# Patient Record
Sex: MALE | Race: WHITE | NOT HISPANIC OR LATINO | Employment: FULL TIME | URBAN - METROPOLITAN AREA
[De-identification: names, ages, dates, MRNs, and addresses within clinical notes are randomized per-mention and may not be internally consistent; named-entity substitution may affect disease eponyms.]

---

## 2017-10-18 ENCOUNTER — ALLSCRIPTS OFFICE VISIT (OUTPATIENT)
Dept: OTHER | Facility: OTHER | Age: 42
End: 2017-10-18

## 2017-10-18 DIAGNOSIS — M54.16 RADICULOPATHY OF LUMBAR REGION: ICD-10-CM

## 2017-10-18 DIAGNOSIS — M54.31 SCIATICA OF RIGHT SIDE: ICD-10-CM

## 2017-10-24 ENCOUNTER — APPOINTMENT (OUTPATIENT)
Dept: PHYSICAL THERAPY | Facility: CLINIC | Age: 42
End: 2017-10-24
Payer: COMMERCIAL

## 2017-10-24 ENCOUNTER — GENERIC CONVERSION - ENCOUNTER (OUTPATIENT)
Dept: OTHER | Facility: OTHER | Age: 42
End: 2017-10-24

## 2017-10-24 PROCEDURE — 97140 MANUAL THERAPY 1/> REGIONS: CPT

## 2017-10-24 PROCEDURE — 97162 PT EVAL MOD COMPLEX 30 MIN: CPT

## 2017-10-24 PROCEDURE — G8990 OTHER PT/OT CURRENT STATUS: HCPCS

## 2017-10-24 PROCEDURE — G8991 OTHER PT/OT GOAL STATUS: HCPCS

## 2017-10-26 ENCOUNTER — APPOINTMENT (OUTPATIENT)
Dept: PHYSICAL THERAPY | Facility: CLINIC | Age: 42
End: 2017-10-26
Payer: COMMERCIAL

## 2017-10-26 PROCEDURE — 97110 THERAPEUTIC EXERCISES: CPT

## 2017-10-26 PROCEDURE — 97112 NEUROMUSCULAR REEDUCATION: CPT

## 2017-10-26 PROCEDURE — 97140 MANUAL THERAPY 1/> REGIONS: CPT

## 2017-10-31 ENCOUNTER — APPOINTMENT (OUTPATIENT)
Dept: PHYSICAL THERAPY | Facility: CLINIC | Age: 42
End: 2017-10-31
Payer: COMMERCIAL

## 2017-10-31 PROCEDURE — 97110 THERAPEUTIC EXERCISES: CPT

## 2017-10-31 PROCEDURE — 97112 NEUROMUSCULAR REEDUCATION: CPT

## 2017-10-31 PROCEDURE — 97140 MANUAL THERAPY 1/> REGIONS: CPT

## 2017-11-02 ENCOUNTER — APPOINTMENT (OUTPATIENT)
Dept: PHYSICAL THERAPY | Facility: CLINIC | Age: 42
End: 2017-11-02
Payer: COMMERCIAL

## 2017-11-02 PROCEDURE — 97140 MANUAL THERAPY 1/> REGIONS: CPT

## 2017-11-02 PROCEDURE — 97112 NEUROMUSCULAR REEDUCATION: CPT

## 2017-11-02 PROCEDURE — 97110 THERAPEUTIC EXERCISES: CPT

## 2017-11-07 ENCOUNTER — APPOINTMENT (OUTPATIENT)
Dept: PHYSICAL THERAPY | Facility: CLINIC | Age: 42
End: 2017-11-07
Payer: COMMERCIAL

## 2017-11-07 PROCEDURE — 97140 MANUAL THERAPY 1/> REGIONS: CPT

## 2017-11-07 PROCEDURE — 97112 NEUROMUSCULAR REEDUCATION: CPT

## 2017-11-07 PROCEDURE — 97110 THERAPEUTIC EXERCISES: CPT

## 2017-11-09 ENCOUNTER — APPOINTMENT (OUTPATIENT)
Dept: PHYSICAL THERAPY | Facility: CLINIC | Age: 42
End: 2017-11-09
Payer: COMMERCIAL

## 2017-11-09 PROCEDURE — 97140 MANUAL THERAPY 1/> REGIONS: CPT

## 2017-11-09 PROCEDURE — 97110 THERAPEUTIC EXERCISES: CPT

## 2017-11-09 PROCEDURE — 97112 NEUROMUSCULAR REEDUCATION: CPT

## 2017-11-14 ENCOUNTER — APPOINTMENT (OUTPATIENT)
Dept: PHYSICAL THERAPY | Facility: CLINIC | Age: 42
End: 2017-11-14
Payer: COMMERCIAL

## 2017-11-14 PROCEDURE — 97110 THERAPEUTIC EXERCISES: CPT

## 2017-11-14 PROCEDURE — 97140 MANUAL THERAPY 1/> REGIONS: CPT

## 2017-11-14 PROCEDURE — 97112 NEUROMUSCULAR REEDUCATION: CPT

## 2017-11-16 ENCOUNTER — APPOINTMENT (OUTPATIENT)
Dept: PHYSICAL THERAPY | Facility: CLINIC | Age: 42
End: 2017-11-16
Payer: COMMERCIAL

## 2017-11-16 PROCEDURE — 97110 THERAPEUTIC EXERCISES: CPT

## 2017-11-16 PROCEDURE — 97140 MANUAL THERAPY 1/> REGIONS: CPT

## 2017-11-21 ENCOUNTER — APPOINTMENT (OUTPATIENT)
Dept: PHYSICAL THERAPY | Facility: CLINIC | Age: 42
End: 2017-11-21
Payer: COMMERCIAL

## 2017-11-21 PROCEDURE — 97110 THERAPEUTIC EXERCISES: CPT

## 2017-11-21 PROCEDURE — 97140 MANUAL THERAPY 1/> REGIONS: CPT

## 2017-11-21 PROCEDURE — 97112 NEUROMUSCULAR REEDUCATION: CPT

## 2017-11-24 ENCOUNTER — GENERIC CONVERSION - ENCOUNTER (OUTPATIENT)
Dept: OTHER | Facility: OTHER | Age: 42
End: 2017-11-24

## 2017-11-24 ENCOUNTER — APPOINTMENT (OUTPATIENT)
Dept: PHYSICAL THERAPY | Facility: CLINIC | Age: 42
End: 2017-11-24
Payer: COMMERCIAL

## 2017-11-24 PROCEDURE — 97112 NEUROMUSCULAR REEDUCATION: CPT

## 2017-11-24 PROCEDURE — 97110 THERAPEUTIC EXERCISES: CPT

## 2017-11-28 ENCOUNTER — GENERIC CONVERSION - ENCOUNTER (OUTPATIENT)
Dept: OTHER | Facility: OTHER | Age: 42
End: 2017-11-28

## 2017-11-28 ENCOUNTER — APPOINTMENT (OUTPATIENT)
Dept: PHYSICAL THERAPY | Facility: CLINIC | Age: 42
End: 2017-11-28
Payer: COMMERCIAL

## 2017-11-28 PROCEDURE — G8990 OTHER PT/OT CURRENT STATUS: HCPCS

## 2017-11-28 PROCEDURE — 97110 THERAPEUTIC EXERCISES: CPT

## 2017-11-28 PROCEDURE — 97140 MANUAL THERAPY 1/> REGIONS: CPT

## 2017-11-28 PROCEDURE — 97112 NEUROMUSCULAR REEDUCATION: CPT

## 2017-11-28 PROCEDURE — G8991 OTHER PT/OT GOAL STATUS: HCPCS

## 2017-11-29 ENCOUNTER — ALLSCRIPTS OFFICE VISIT (OUTPATIENT)
Dept: OTHER | Facility: OTHER | Age: 42
End: 2017-11-29

## 2017-11-29 ENCOUNTER — APPOINTMENT (OUTPATIENT)
Dept: RADIOLOGY | Facility: CLINIC | Age: 42
End: 2017-11-29
Payer: COMMERCIAL

## 2017-11-29 DIAGNOSIS — M54.9 DORSALGIA: ICD-10-CM

## 2017-11-29 DIAGNOSIS — M54.16 RADICULOPATHY OF LUMBAR REGION: ICD-10-CM

## 2017-11-29 PROCEDURE — 72100 X-RAY EXAM L-S SPINE 2/3 VWS: CPT

## 2017-12-01 ENCOUNTER — APPOINTMENT (OUTPATIENT)
Dept: PHYSICAL THERAPY | Facility: CLINIC | Age: 42
End: 2017-12-01
Payer: COMMERCIAL

## 2017-12-01 PROCEDURE — 97112 NEUROMUSCULAR REEDUCATION: CPT

## 2017-12-01 PROCEDURE — 97110 THERAPEUTIC EXERCISES: CPT

## 2017-12-05 ENCOUNTER — APPOINTMENT (OUTPATIENT)
Dept: PHYSICAL THERAPY | Facility: CLINIC | Age: 42
End: 2017-12-05
Payer: COMMERCIAL

## 2017-12-05 PROCEDURE — 97110 THERAPEUTIC EXERCISES: CPT

## 2017-12-07 ENCOUNTER — APPOINTMENT (OUTPATIENT)
Dept: PHYSICAL THERAPY | Facility: CLINIC | Age: 42
End: 2017-12-07
Payer: COMMERCIAL

## 2017-12-07 PROCEDURE — 97140 MANUAL THERAPY 1/> REGIONS: CPT

## 2017-12-07 PROCEDURE — 97110 THERAPEUTIC EXERCISES: CPT

## 2017-12-12 ENCOUNTER — GENERIC CONVERSION - ENCOUNTER (OUTPATIENT)
Dept: OTHER | Facility: OTHER | Age: 42
End: 2017-12-12

## 2017-12-12 ENCOUNTER — APPOINTMENT (OUTPATIENT)
Dept: PHYSICAL THERAPY | Facility: CLINIC | Age: 42
End: 2017-12-12
Payer: COMMERCIAL

## 2017-12-15 ENCOUNTER — APPOINTMENT (OUTPATIENT)
Dept: PHYSICAL THERAPY | Facility: CLINIC | Age: 42
End: 2017-12-15
Payer: COMMERCIAL

## 2017-12-19 ENCOUNTER — APPOINTMENT (OUTPATIENT)
Dept: PHYSICAL THERAPY | Facility: CLINIC | Age: 42
End: 2017-12-19
Payer: COMMERCIAL

## 2017-12-28 ENCOUNTER — APPOINTMENT (OUTPATIENT)
Dept: PHYSICAL THERAPY | Facility: CLINIC | Age: 42
End: 2017-12-28
Payer: COMMERCIAL

## 2018-01-10 NOTE — MISCELLANEOUS
Message  Return to work or school:   Josh Panchal is under my professional care  He was seen in my office on 11/29/2017CONTIN[employer] DUTY AT [address] [place] [address],         Signatures   Electronically signed by : VALENTE Craft ; Nov 29 2017  9:42AM EST                       (Author)

## 2018-01-11 NOTE — MISCELLANEOUS
Signatures   Electronically signed by : VALENTE Alas ; Nov 29 2017  9:40AM EST                       (Author)

## 2018-01-16 NOTE — MISCELLANEOUS
Message  Return to work or school:   Mulugeta Denny is under my professional care  He was seen in my office on OCTOBER 18, 2017  No heavy lifting at work at this time  Any questions please call our office  Radha Aguilar DO        Signatures   Electronically signed by : VALENTE Hernandez ; Oct 18 2017 11:36AM EST                       (Author)

## 2018-01-22 VITALS
HEIGHT: 76 IN | DIASTOLIC BLOOD PRESSURE: 88 MMHG | BODY MASS INDEX: 26.79 KG/M2 | WEIGHT: 220 LBS | HEART RATE: 77 BPM | SYSTOLIC BLOOD PRESSURE: 120 MMHG

## 2018-02-18 ENCOUNTER — OFFICE VISIT (OUTPATIENT)
Dept: URGENT CARE | Facility: CLINIC | Age: 43
End: 2018-02-18
Payer: COMMERCIAL

## 2018-02-18 VITALS
WEIGHT: 238.2 LBS | HEART RATE: 93 BPM | DIASTOLIC BLOOD PRESSURE: 68 MMHG | SYSTOLIC BLOOD PRESSURE: 112 MMHG | RESPIRATION RATE: 16 BRPM | OXYGEN SATURATION: 96 % | HEIGHT: 76 IN | BODY MASS INDEX: 29.01 KG/M2 | TEMPERATURE: 100 F

## 2018-02-18 DIAGNOSIS — J06.9 ACUTE URI: Primary | ICD-10-CM

## 2018-02-18 PROBLEM — M54.9 BACK PAIN: Status: ACTIVE | Noted: 2017-11-29

## 2018-02-18 PROBLEM — M54.31 SCIATICA OF RIGHT SIDE: Status: ACTIVE | Noted: 2017-10-18

## 2018-02-18 PROBLEM — M54.16 LUMBAR RADICULOPATHY, RIGHT: Status: ACTIVE | Noted: 2017-10-18

## 2018-02-18 PROCEDURE — 99213 OFFICE O/P EST LOW 20 MIN: CPT | Performed by: FAMILY MEDICINE

## 2018-02-18 RX ORDER — FLUTICASONE PROPIONATE 50 MCG
SPRAY, SUSPENSION (ML) NASAL
COMMUNITY
End: 2018-12-01

## 2018-02-18 RX ORDER — VALACYCLOVIR HYDROCHLORIDE 1 G/1
TABLET, FILM COATED ORAL 2 TIMES DAILY PRN
COMMUNITY

## 2018-02-18 RX ORDER — BENZONATATE 200 MG/1
200 CAPSULE ORAL 3 TIMES DAILY PRN
Qty: 20 CAPSULE | Refills: 0 | Status: SHIPPED | OUTPATIENT
Start: 2018-02-18 | End: 2018-12-01

## 2018-02-18 NOTE — PATIENT INSTRUCTIONS
Acute viral upper resp infection, possibly the flu, however the patient is out of the window for treatment w/ Tamiflu at this time  There are no signs of bacterial infection on exam  I have advised supportive treatment with rest, plenty of fluids, Tylenol or Motrin as needed, warm salt water gargles, throat lozenges, and I have prescribed Tessalon pearls to be taken as needed for the cough  If symptoms persist despite treatment or worsen, should be seen by PCP for re-check

## 2018-02-18 NOTE — PROGRESS NOTES
Assessment/Plan:  Patient Instructions   Acute viral upper resp infection, possibly the flu, however the patient is out of the window for treatment w/ Tamiflu at this time  There are no signs of bacterial infection on exam  I have advised supportive treatment with rest, plenty of fluids, Tylenol or Motrin as needed, warm salt water gargles, throat lozenges, and I have prescribed Tessalon pearls to be taken as needed for the cough  If symptoms persist despite treatment or worsen, should be seen by PCP for re-check  No problem-specific Assessment & Plan notes found for this encounter  Diagnoses and all orders for this visit:    Acute URI  -     benzonatate (TESSALON) 200 MG capsule; Take 1 capsule (200 mg total) by mouth 3 (three) times a day as needed for cough    Other orders  -     fluticasone (FLONASE) 50 mcg/act nasal spray; into each nostril  -     valACYclovir (VALTREX) 1,000 mg tablet; Take by mouth          Subjective:      Patient ID: Abel Ramsey is a 40 y o  male  41 yo male presents c/o hoarse voice, sore throat, nasal congestion, and productive cough x 4 days  No chest pain, SOB, or wheezing  Non-smoker  Has felt feverish, but no documented fever  No body aches, some chills  No GI sx  No skin rashes  No recent travel or known sick contacts  Did not get the flu shot this year  Has been taking Advil Cold and Sinus as needed  The following portions of the patient's history were reviewed and updated as appropriate: allergies, current medications, past family history, past medical history, past social history, past surgical history and problem list     Review of Systems   Constitutional:        As noted in HPI   HENT:        As noted in HPI   Eyes: Negative  Respiratory: Positive for cough  Negative for shortness of breath and wheezing  Cardiovascular: Negative  Gastrointestinal: Negative  Musculoskeletal: Negative  Skin: Negative  Neurological: Negative  Psychiatric/Behavioral: Negative  Objective:      /68 (BP Location: Right arm, Patient Position: Sitting, Cuff Size: Large)   Pulse 93   Temp 100 °F (37 8 °C) (Tympanic)   Resp 16   Ht 6' 4" (1 93 m)   Wt 108 kg (238 lb 3 2 oz)   SpO2 96%   BMI 28 99 kg/m²          Physical Exam   Constitutional: He is oriented to person, place, and time  Vital signs are normal  He appears well-developed and well-nourished  He is active and cooperative  No distress  HENT:   Head: Normocephalic and atraumatic  Right Ear: Hearing, tympanic membrane, external ear and ear canal normal    Left Ear: Hearing, tympanic membrane, external ear and ear canal normal    Nose: Nose normal    Mouth/Throat: Oropharynx is clear and moist and mucous membranes are normal  No oropharyngeal exudate  Eyes: Conjunctivae and EOM are normal  Pupils are equal, round, and reactive to light  Neck: Normal range of motion  Neck supple  Cardiovascular: Normal rate, regular rhythm and normal heart sounds  Pulmonary/Chest: Effort normal and breath sounds normal  No respiratory distress  He has no wheezes  He has no rales  Lymphadenopathy:     He has no cervical adenopathy  Neurological: He is alert and oriented to person, place, and time  Psychiatric: He has a normal mood and affect  His speech is normal and behavior is normal  Judgment and thought content normal  Cognition and memory are normal    Nursing note and vitals reviewed

## 2018-12-01 ENCOUNTER — OFFICE VISIT (OUTPATIENT)
Dept: URGENT CARE | Facility: CLINIC | Age: 43
End: 2018-12-01
Payer: COMMERCIAL

## 2018-12-01 VITALS
HEART RATE: 77 BPM | RESPIRATION RATE: 18 BRPM | DIASTOLIC BLOOD PRESSURE: 80 MMHG | HEIGHT: 76 IN | SYSTOLIC BLOOD PRESSURE: 110 MMHG | BODY MASS INDEX: 28.69 KG/M2 | OXYGEN SATURATION: 97 % | WEIGHT: 235.6 LBS | TEMPERATURE: 97.1 F

## 2018-12-01 DIAGNOSIS — J01.90 ACUTE NON-RECURRENT SINUSITIS, UNSPECIFIED LOCATION: Primary | ICD-10-CM

## 2018-12-01 PROCEDURE — 99213 OFFICE O/P EST LOW 20 MIN: CPT | Performed by: PHYSICIAN ASSISTANT

## 2018-12-01 RX ORDER — NAPROXEN 500 MG/1
TABLET ORAL DAILY PRN
Refills: 0 | COMMUNITY
Start: 2018-10-25 | End: 2019-08-06

## 2018-12-01 RX ORDER — CEFUROXIME AXETIL 500 MG/1
500 TABLET ORAL EVERY 12 HOURS SCHEDULED
Qty: 20 TABLET | Refills: 0 | Status: SHIPPED | OUTPATIENT
Start: 2018-12-01 | End: 2018-12-11

## 2018-12-01 NOTE — PROGRESS NOTES
[facility]        NAME: Maddy Bell is a 44 y o  male  : 1975    MRN: [id number]  DATE: 2018  TIME: 10:19 AM    Assessment and Plan   Acute non-recurrent sinusitis, unspecified location [J01 90]  1  Acute non-recurrent sinusitis, unspecified location  cefuroxime (CEFTIN) 500 mg tablet         Patient Instructions     Discussed pt's condition with him  He has persistent symptoms of 1 week duration with no improvement despite adequate OTC symptomatic management  I will treat his acute sinusitis with a 10 day course of Ceftin and rec hydration, rest, continuing OTC cold meds and Veramyst, and observation  Follow up with PCP in 3-5 days  Proceed to  ER if symptoms worsen  Chief Complaint     Chief Complaint   Patient presents with    Facial Pain     x 1 week with nasal congestion, facial pain and b/l ear pressure with HA  No cough  fever  Taking Veramyst daily and  Claritin D  History of Present Illness       Pt presents with 1 week history of nasal congestion, B/L ear pressure, HA  Has had some discolored drainage as well as right sided epistaxis  Denies cough, ST, PND, fever, chills, N/V/D  He has taken Advil, Tylenol, Claritin-D and Veramyst  Has hx of allergies and is exposed to a fair amount of dust  Denies hx of asthma/allergies  Does not smoke  He did not receive the flu shot  Review of Systems   Review of Systems   Constitutional: Negative  HENT: Positive for congestion, ear pain (B/L pressure), sinus pain and sinus pressure  Negative for postnasal drip and sore throat  Respiratory: Negative  Cardiovascular: Negative  Gastrointestinal: Negative  Genitourinary: Negative  Neurological: Positive for headaches           Current Medications       Current Outpatient Prescriptions:     fluticasone (VERAMYST) 27 5 MCG/SPRAY nasal spray, 2 sprays into each nostril daily as needed for rhinitis, Disp: , Rfl:     naproxen (NAPROSYN) 500 mg tablet, daily as needed, Disp: , Rfl: 0    valACYclovir (VALTREX) 1,000 mg tablet, Take by mouth 2 (two) times a day as needed  , Disp: , Rfl:     cefuroxime (CEFTIN) 500 mg tablet, Take 1 tablet (500 mg total) by mouth every 12 (twelve) hours for 10 days Take with food  , Disp: 20 tablet, Rfl: 0    Current Allergies     Allergies as of 12/01/2018 - Reviewed 12/01/2018   Allergen Reaction Noted    Betadine [povidone iodine] Rash 02/18/2018            The following portions of the patient's history were reviewed and updated as appropriate: allergies, current medications, past family history, past medical history, past social history, past surgical history and problem list      Past Medical History:   Diagnosis Date    Allergic rhinitis     Herniated lumbar intervertebral disc        Past Surgical History:   Procedure Laterality Date    KNEE ARTHROSCOPY      THROAT SURGERY         Family History   Problem Relation Age of Onset    Diabetes Mother     Diabetes Father     Hypertension Father          Medications have been verified  Objective   /80 (BP Location: Left arm, Patient Position: Sitting, Cuff Size: Standard)   Pulse 77   Temp (!) 97 1 °F (36 2 °C) (Tympanic)   Resp 18   Ht 6' 4" (1 93 m)   Wt 107 kg (235 lb 9 6 oz)   SpO2 97%   BMI 28 68 kg/m²        Physical Exam     Physical Exam   Constitutional: He is oriented to person, place, and time  He appears well-developed and well-nourished  No distress  HENT:   Right Ear: Hearing, external ear and ear canal normal    Left Ear: Hearing, external ear and ear canal normal    Nose: Mucosal edema (B/L boggy erythematous turbinates) present  Mouth/Throat: Oropharynx is clear and moist and mucous membranes are normal    B/L dull TMs   Neck: Neck supple  Cardiovascular: Normal rate, regular rhythm and normal heart sounds  Pulmonary/Chest: Effort normal and breath sounds normal    Lymphadenopathy:     He has no cervical adenopathy  Neurological: He is alert and oriented to person, place, and time  Psychiatric: He has a normal mood and affect  Vitals reviewed

## 2019-04-08 PROBLEM — J45.909 UNCOMPLICATED ASTHMA: Status: ACTIVE | Noted: 2019-04-08

## 2019-04-08 PROBLEM — J30.89 ALLERGIC RHINITIS CAUSED BY MOLD: Status: ACTIVE | Noted: 2019-04-08

## 2019-04-08 PROBLEM — H10.13 ALLERGIC CONJUNCTIVITIS OF BOTH EYES: Status: ACTIVE | Noted: 2019-04-08

## 2019-04-08 PROBLEM — J30.1 SEASONAL ALLERGIC RHINITIS DUE TO POLLEN: Status: ACTIVE | Noted: 2019-04-08

## 2019-08-06 ENCOUNTER — OFFICE VISIT (OUTPATIENT)
Dept: URGENT CARE | Facility: CLINIC | Age: 44
End: 2019-08-06
Payer: COMMERCIAL

## 2019-08-06 VITALS
OXYGEN SATURATION: 99 % | RESPIRATION RATE: 18 BRPM | SYSTOLIC BLOOD PRESSURE: 140 MMHG | WEIGHT: 234.2 LBS | TEMPERATURE: 98.7 F | HEART RATE: 85 BPM | BODY MASS INDEX: 28.52 KG/M2 | HEIGHT: 76 IN | DIASTOLIC BLOOD PRESSURE: 70 MMHG
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
## 2019-08-06 DIAGNOSIS — S80.861A INSECT BITE OF RIGHT LEG, INITIAL ENCOUNTER: Primary | ICD-10-CM

## 2019-08-06 PROCEDURE — 99213 OFFICE O/P EST LOW 20 MIN: CPT | Performed by: FAMILY MEDICINE

## 2019-08-06 RX ORDER — LORATADINE AND PSEUDOEPHEDRINE 10; 240 MG/1; MG/1
1 TABLET, EXTENDED RELEASE ORAL DAILY PRN
COMMUNITY

## 2019-08-06 NOTE — PROGRESS NOTES
[facility]        NAME: Maxwell Burden is a 38 y o  male  : 1975    MRN: [id number]  DATE: 2019  TIME: 7:36 PM    Assessment and Plan   Insect bite of right leg, initial encounter [H23 913G, W57  XXXA]  1  Insect bite of right leg, initial encounter       Insect sting to the right lower extremity with mild edema  Given 4 days of prednisone 10 mg to counteract symptoms  Instructed to take a lower 40 mg today followed by 20 mg for the next 3 days  Dispensed medication:  Prednisone 10 mg    Patient Instructions     Follow up with PCP in 3-5 days  Proceed to  ER if symptoms worsen  Chief Complaint     Chief Complaint   Patient presents with    Insect Bite      - thinks he was stung by a yellow jacket multiple times medial R calf area  Has had 3 cm central red area  Today noted swelling and redness from foot to 3/4 way up R calf with increaseed tenderness  Took Benadryl 25 mg and Motrin 600 mg at 6 pm  Is to leave on airplane on vacation tomorrow morning  History of Present Illness     62-year-old male presents today due to an insect bite of the right lower extremity which occurred 2 days ago while mountain biking  While riding his bike, he felt a sting in the right lower leg  On examination he did not see a stinger  Eventually noticed area of redness in a cervical which progressed to swelling of right lower leg and ankle  Has been applying ice, taking Benadryl and Advil which has provided moderate relief  Denies any fevers, chills  Did not see the insect that stung him  Denies allergies to Hymenoptera stings  Plans to fly out to [place] tomorrow morning  Review of Systems   Review of Systems   Constitutional: Negative for chills and fever  Musculoskeletal: Positive for arthralgias  Skin: Positive for color change and rash           Current Medications       Current Outpatient Medications:     Acetaminophen 325 MG CAPS, Take by mouth, Disp: , Rfl:    fluticasone (VERAMYST) 27 5 MCG/SPRAY nasal spray, 2 sprays into each nostril daily as needed for rhinitis, Disp: , Rfl:     guaiFENesin (MUCINEX) 600 mg 12 hr tablet, Take 1,200 mg by mouth every 12 (twelve) hours as needed , Disp: , Rfl:     Ibuprofen 200 MG CAPS, Take 600 mg by mouth every 8 (eight) hours as needed , Disp: , Rfl:     loratadine-pseudoephedrine (CLARITIN-D 24-HOUR)  mg per 24 hr tablet, Take 1 tablet by mouth daily as needed for allergies, Disp: , Rfl:     valACYclovir (VALTREX) 1,000 mg tablet, Take by mouth 2 (two) times a day as needed  , Disp: , Rfl:     Current Allergies     Allergies as of 08/06/2019 - Reviewed 08/06/2019   Allergen Reaction Noted    Betadine [povidone iodine] Rash 02/18/2018            The following portions of the patient's history were reviewed and updated as appropriate: allergies, current medications, past family history, past medical history, past social history, past surgical history and problem list      Past Medical History:   Diagnosis Date    Allergic rhinitis     GERD (gastroesophageal reflux disease)     Herniated lumbar intervertebral disc     Migraines     Sinusitis        Past Surgical History:   Procedure Laterality Date    KNEE ARTHROSCOPY      THROAT SURGERY      WISDOM TOOTH EXTRACTION      X4       Family History   Problem Relation Age of Onset    Diabetes Mother     Diabetes Father     Hypertension Father     No Known Problems Sister     Mental illness Brother     Substance Abuse Brother          Medications have been verified  Objective   /70 (BP Location: Left arm, Patient Position: Sitting, Cuff Size: Large)   Pulse 85   Temp 98 7 °F (37 1 °C) (Tympanic)   Resp 18   Ht 6' 4" (1 93 m)   Wt 106 kg (234 lb 3 2 oz)   SpO2 99%   BMI 28 51 kg/m²        Physical Exam     Physical Exam   Constitutional: He is oriented to person, place, and time  He appears well-developed and well-nourished  No distress     HENT: Head: Normocephalic and atraumatic  Eyes: Conjunctivae are normal    Pulmonary/Chest: Effort normal    Neurological: He is alert and oriented to person, place, and time  Skin: Skin is warm  Rash noted  He is not diaphoretic  There is erythema  Area of ecchymoses on the anterior portion of right lower leg with distal faint erythema and swelling of the lower extremity  Psychiatric: He has a normal mood and affect  His behavior is normal  Judgment and thought content normal    Nursing note and vitals reviewed

## 2023-01-25 ENCOUNTER — OFFICE VISIT (OUTPATIENT)
Dept: URGENT CARE | Facility: CLINIC | Age: 48
End: 2023-01-25

## 2023-01-25 VITALS — TEMPERATURE: 98.5 F | OXYGEN SATURATION: 100 % | HEART RATE: 107 BPM

## 2023-01-25 DIAGNOSIS — Z20.828 CONTACT WITH AND (SUSPECTED) EXPOSURE TO OTHER VIRAL COMMUNICABLE DISEASES: ICD-10-CM

## 2023-01-25 DIAGNOSIS — J06.9 UPPER RESPIRATORY TRACT INFECTION, UNSPECIFIED TYPE: Primary | ICD-10-CM

## 2023-01-26 LAB
FLUAV RNA RESP QL NAA+PROBE: NEGATIVE
FLUBV RNA RESP QL NAA+PROBE: NEGATIVE
SARS-COV-2 RNA RESP QL NAA+PROBE: NEGATIVE

## 2023-01-26 NOTE — PROGRESS NOTES
[facility]        NAME: Neo Hanson is a 56 y o  male  : 1975    MRN: [id number]  DATE: 2023  TIME: 7:37 PM    Assessment and Plan   Upper respiratory tract infection, unspecified type [J06 9]  1  Upper respiratory tract infection, unspecified type  Covid19 and INFLUENZA A/B PCR      2  Contact with and (suspected) exposure to other viral communicable diseases  Covid19 and INFLUENZA A/B PCR        Discussed strict return to care precautions as well as red flag symptoms which should prompt immediate ED referral  Pt verbalized understanding and is in agreement with plan  Please follow up with your primary care provider within the next week  Please remember that your visit today was with an urgent care provider and should not replace follow up with your primary care provider for chronic medical issues or annual physicals  Advised to continue supportive care, must maintain adequate hydration  ([facility] from [facility])  IF YOU TEST POSITIVE FOR COVID-19:  If you have symptoms, you can end isolation after 5 full days if you are fever-free for 24 hours without the use of fever-reducing medication and your other symptoms have improved  Loss of taste and/or smell may persist for weeks or more and should not delay the end of isolation  Your first day of symptoms is DAY ZERO  You should continue to wear a well-fitting mask (like N95, [id number]) both at home and in public for 5 additional days  Avoid people who are at high risk for severe disease for at least 10 days  DO NOT go to places where you are unable to wear a mask until a full 10 days from your first symptom  If you have no symptoms, quarantine for 5 days from the day you were tested  The day you were tested is DAY ZERO  Continue wearing a mask around others until day 10  If you develop symptoms, your 5-day isolation period starts over    If you have/had severe symptoms and/or a compromised immune system, you may need to isolate longer  Consult with your primary care provider about when you can resume being around other people  IF YOU HAVE HAD CLOSE EXPOSURE:  QUARANTINE IF: You are ages 25 or older and either have not been vaccinated, or have been vaccinated with your primary series but not the booster  You must quarantine for at least 5 days after your last contact  If you develop symptoms, get tested immediately  If you do not develop symptoms, get tested at least 5 days after your last exposure  Avoid people who are immunocompromised at high risk for severe disease until at least after 10 days  YOU DO NOT HAVE TO QUARANTINE IF:   • You are 18 years or older and have received all recommended vaccine doses, including boosters and additional primary shots for some immunocompromised people  • You are ages 9-17 and completed the primary series of COVID-19 vaccines  • You had confirmed COVID-19 within the last 90 days on a viral test   You should still wear a well-fitting mask around others for 10 days from the date of your last close exposure to COVID-19, and get tested at least 5 days later  Quarantine and isolation guidelines differ for healthcare professionals  Patient Instructions       Follow up with PCP in 3-5 days  Proceed to  ER if symptoms worsen  Chief Complaint     Chief Complaint   Patient presents with   • Cold Like Symptoms     Congestion, sore throat, cough started yesterday   Concerned for sinus infection         History of Present Illness       Mich Guerin is a(n) 56 y o  male presenting with URI symptoms x 1 days  Past medical history: none reported  Congestion: yes  Sore throat: yes  Cough: yes  Sputum production: yes, slight  Fever: no  Body aches: no  Loss of smell/taste: no  GI symptoms: no  Known sick contacts: yes, coworker was coughing but unsure if he tested for anything  OTC meds tried: dayquil, mucinex-d  Vaccinated against COVID19: yes and flu        Review of Systems   Review of Systems Constitutional: Negative for chills, diaphoresis, fatigue and fever  HENT: Positive for congestion, sinus pressure and sore throat  Negative for ear pain, postnasal drip, rhinorrhea, sinus pain, sneezing and trouble swallowing  Eyes: Negative for pain and redness  Respiratory: Positive for cough  Negative for chest tightness, shortness of breath and wheezing  Cardiovascular: Negative for chest pain and leg swelling  Gastrointestinal: Negative for diarrhea, nausea and vomiting  Musculoskeletal: Negative for myalgias  Neurological: Negative for dizziness, weakness and headaches           Current Medications       Current Outpatient Medications:   •  Acetaminophen 325 MG CAPS, Take by mouth (Patient not taking: Reported on 1/25/2023), Disp: , Rfl:   •  fluticasone (VERAMYST) 27 5 MCG/SPRAY nasal spray, 2 sprays into each nostril daily as needed for rhinitis, Disp: , Rfl:   •  guaiFENesin (MUCINEX) 600 mg 12 hr tablet, Take 1,200 mg by mouth every 12 (twelve) hours as needed  (Patient not taking: Reported on 1/25/2023), Disp: , Rfl:   •  Ibuprofen 200 MG CAPS, Take 600 mg by mouth every 8 (eight) hours as needed  (Patient not taking: Reported on 1/25/2023), Disp: , Rfl:   •  loratadine-pseudoephedrine (CLARITIN-D 24-HOUR)  mg per 24 hr tablet, Take 1 tablet by mouth daily as needed for allergies (Patient not taking: Reported on 1/25/2023), Disp: , Rfl:   •  valACYclovir (VALTREX) 1,000 mg tablet, Take by mouth 2 (two) times a day as needed   (Patient not taking: Reported on 1/25/2023), Disp: , Rfl:     Current Allergies     Allergies as of 01/25/2023 - Reviewed 01/25/2023   Allergen Reaction Noted   • Betadine [povidone iodine] Rash 02/18/2018            The following portions of the patient's history were reviewed and updated as appropriate: allergies, current medications, past family history, past medical history, past social history, past surgical history and problem list      Past Medical History:   Diagnosis Date   • Allergic rhinitis    • GERD (gastroesophageal reflux disease)    • Herniated lumbar intervertebral disc    • Migraines    • Sinusitis        Past Surgical History:   Procedure Laterality Date   • KNEE ARTHROSCOPY     • THROAT SURGERY     • WISDOM TOOTH EXTRACTION      X4       Family History   Problem Relation Age of Onset   • Diabetes Mother    • Diabetes Father    • Hypertension Father    • No Known Problems Sister    • Mental illness Brother    • Substance Abuse Brother          Medications have been verified  Objective   Pulse (!) 107   Temp 98 5 °F (36 9 °C)   SpO2 100%        Physical Exam     Physical Exam  Vitals and nursing note reviewed  Constitutional:       General: He is not in acute distress  Appearance: Normal appearance  He is not toxic-appearing  HENT:      Head: Normocephalic and atraumatic  Right Ear: Tympanic membrane, ear canal and external ear normal       Left Ear: Tympanic membrane, ear canal and external ear normal       Nose: No congestion  Mouth/Throat:      Mouth: Mucous membranes are moist       Pharynx: Oropharynx is clear  No oropharyngeal exudate or posterior oropharyngeal erythema  Eyes:      Conjunctiva/sclera: Conjunctivae normal       Pupils: Pupils are equal, round, and reactive to light  Cardiovascular:      Rate and Rhythm: Normal rate and regular rhythm  Heart sounds: Normal heart sounds  Pulmonary:      Effort: Pulmonary effort is normal  No respiratory distress  Breath sounds: Normal breath sounds  No wheezing, rhonchi or rales  Abdominal:      General: Abdomen is flat  Palpations: Abdomen is soft  Musculoskeletal:      Cervical back: Normal range of motion and neck supple  Skin:     General: Skin is warm and dry  Capillary Refill: Capillary refill takes less than 2 seconds  Neurological:      Mental Status: He is alert and oriented to person, place, and time     Psychiatric: Behavior: Behavior normal

## 2024-02-21 PROBLEM — H10.13 ALLERGIC CONJUNCTIVITIS OF BOTH EYES: Status: RESOLVED | Noted: 2019-04-08 | Resolved: 2024-02-21

## 2024-02-21 PROBLEM — J06.9 ACUTE URI: Status: RESOLVED | Noted: 2018-02-18 | Resolved: 2024-02-21

## 2024-03-27 ENCOUNTER — APPOINTMENT (OUTPATIENT)
Dept: RADIOLOGY | Facility: CLINIC | Age: 49
End: 2024-03-27
Payer: COMMERCIAL

## 2024-03-27 ENCOUNTER — OFFICE VISIT (OUTPATIENT)
Age: 49
End: 2024-03-27
Payer: COMMERCIAL

## 2024-03-27 VITALS
SYSTOLIC BLOOD PRESSURE: 140 MMHG | WEIGHT: 234 LBS | DIASTOLIC BLOOD PRESSURE: 90 MMHG | HEART RATE: 75 BPM | BODY MASS INDEX: 28.49 KG/M2 | HEIGHT: 76 IN

## 2024-03-27 DIAGNOSIS — M79.671 BILATERAL FOOT PAIN: Primary | ICD-10-CM

## 2024-03-27 DIAGNOSIS — M79.671 BILATERAL FOOT PAIN: ICD-10-CM

## 2024-03-27 DIAGNOSIS — M76.62 TENDONITIS, ACHILLES, LEFT: ICD-10-CM

## 2024-03-27 DIAGNOSIS — M79.672 BILATERAL FOOT PAIN: ICD-10-CM

## 2024-03-27 DIAGNOSIS — M79.672 BILATERAL FOOT PAIN: Primary | ICD-10-CM

## 2024-03-27 PROCEDURE — 73630 X-RAY EXAM OF FOOT: CPT

## 2024-03-27 PROCEDURE — 99204 OFFICE O/P NEW MOD 45 MIN: CPT | Performed by: STUDENT IN AN ORGANIZED HEALTH CARE EDUCATION/TRAINING PROGRAM

## 2024-03-27 RX ORDER — MELOXICAM 15 MG/1
15 TABLET ORAL DAILY
Qty: 30 TABLET | Refills: 0 | Status: SHIPPED | OUTPATIENT
Start: 2024-03-27

## 2024-03-27 RX ORDER — COLCHICINE 0.6 MG/1
CAPSULE ORAL
COMMUNITY
Start: 2024-03-21

## 2024-03-27 NOTE — PROGRESS NOTES
This patient was seen on 3/27/2024.    My role is Foot , Ankle, and Wound Specialist    ASSESSMENT     Diagnoses and all orders for this visit:    Bilateral foot pain  -     X-ray foot left 3+ views; Future  -     X-ray foot right 3+ views; Future    Tendonitis, Achilles, left  -     Ambulatory referral to Physical Therapy; Future  -     meloxicam (Mobic) 15 mg tablet; Take 1 tablet (15 mg total) by mouth daily    Other orders  -     Colchicine (Mitigare) 0.6 MG CAPS         Problem List Items Addressed This Visit    None  Visit Diagnoses       Bilateral foot pain    -  Primary    Relevant Orders    X-ray foot left 3+ views    X-ray foot right 3+ views    Tendonitis, Achilles, left        Relevant Medications    meloxicam (Mobic) 15 mg tablet    Other Relevant Orders    Ambulatory referral to Physical Therapy          PLAN  -Patient was educated regarding their condition.  -Rx voltaren gel to be applied 4x daily  -Recommend supportive sneakers with over-the-counter inserts  -Rx meloxicam  -Rx physical therapy  -RTC in 8-weeks    -X-ray from 3/27/24 of left ankle reviewed by myself: No acute osseous abnormality noted.  No abnormalities noted within the soft tissues.  Small retrocalcaneal exostosis noted at the insertion site of the Achilles tendon.    SUBJECTIVE    Chief Complaint:  Left ankle/foot pain     Patient ID: Carl Herbert     3/27/2024: Carl is a pleasant 48-year-old male who presents today with left Achilles tendon pain.  He states that the right is only minorly painful at the left is much worse.  He states that this has been overall going on since summer.  He states that the pain is mostly around the back of the left ankle.  He states that recently he played basketball and did end up getting a blood blister to his left plantar foot as well as ankle pain and swelling, this eventually healed.  Of note he does have a history of gout and takes Mitigare for flareups.  For his Achilles tendon pain he is  "also attempted Advil, Tylenol, Aleve as well as seeing a chiropractor.  None of this fully alleviated his pain.        The following portions of the patient's history were reviewed and updated as appropriate: allergies, current medications, past family history, past medical history, past social history, past surgical history and problem list.    Review of Systems   Constitutional: Negative.    Respiratory: Negative.     Cardiovascular: Negative.    Gastrointestinal: Negative.    Genitourinary: Negative.    Musculoskeletal:  Positive for myalgias.   Skin: Negative.          OBJECTIVE      /90   Pulse 75   Ht 6' 4\" (1.93 m)   Wt 106 kg (234 lb)   BMI 28.48 kg/m²        Physical Exam  Constitutional:       Appearance: Normal appearance.   HENT:      Head: Normocephalic and atraumatic.   Eyes:      General:         Right eye: No discharge.         Left eye: No discharge.   Cardiovascular:      Rate and Rhythm: Normal rate and regular rhythm.      Pulses:           Dorsalis pedis pulses are 2+ on the right side and 2+ on the left side.        Posterior tibial pulses are 2+ on the right side and 2+ on the left side.   Pulmonary:      Effort: Pulmonary effort is normal.      Breath sounds: Normal breath sounds.   Skin:     General: Skin is warm.      Capillary Refill: Capillary refill takes less than 2 seconds.   Neurological:      Mental Status: He is alert and oriented to person, place, and time.      Sensory: Sensation is intact. No sensory deficit.   Psychiatric:         Mood and Affect: Mood normal.       MSK:  -Pain on palpation of the Achilles tendon at the level of the insertion  -No palpable prominence is noted near the insertion site of the Achilles tendon.  -No gross deformities noted   -MMT is 5/5 to all muscle compartments of the lower extremity  -Ankle dorsiflexion <10deg with knee extended  -Patient is is able to perform single heel rise to LLE. Pain throughout heel rise noted to the " Achilles.    Derm:  -No lesions, abrasions, or open wounds noted  -No noted interdigital maceration, peeling, malodor  -No callus formation noted on exam

## 2024-04-05 ENCOUNTER — TELEPHONE (OUTPATIENT)
Age: 49
End: 2024-04-05

## 2024-04-05 NOTE — TELEPHONE ENCOUNTER
04/05/24  Screened by: Nancy Stinson    Referring Provider Dr. Cagle    Pre- Screening:     There is no height or weight on file to calculate BMI.  Has patient been referred for a routine screening Colonoscopy? yes  Is the patient between 45-75 years old? yes      Previous Colonoscopy no   If yes:    Date:     Facility:     Reason:       SCHEDULING STAFF: If the patient is between 45yrs-49yrs, please advise patient to confirm benefits/coverage with their insurance company for a routine screening colonoscopy, some insurance carriers will only cover at 50yrs or older. If the patient is over 75years old, please schedule an office visit.     Does the patient want to see a Gastroenterologist prior to their procedure OR are they having any GI symptoms? yes    Has the patient been hospitalized or had abdominal surgery in the past 6 months? no    Does the patient use supplemental oxygen? no    Does the patient take Coumadin, Lovenox, Plavix, Elliquis, Xarelto, or other blood thinning medication? no    Has the patient had a stroke, cardiac event, or stent placed in the past year? no    Appt scheduled     SCHEDULING STAFF: If patient answers NO to above questions, then schedule procedure. If patient answers YES to above questions, then schedule office appointment.     If patient is between 45yrs - 49yrs, please advise patient that we will have to confirm benefits & coverage with their insurance company for a routine screening colonoscopy.

## 2024-04-08 ENCOUNTER — EVALUATION (OUTPATIENT)
Dept: PHYSICAL THERAPY | Facility: CLINIC | Age: 49
End: 2024-04-08
Payer: COMMERCIAL

## 2024-04-08 DIAGNOSIS — M76.62 TENDONITIS, ACHILLES, LEFT: ICD-10-CM

## 2024-04-08 PROCEDURE — 97161 PT EVAL LOW COMPLEX 20 MIN: CPT | Performed by: PHYSICAL THERAPIST

## 2024-04-08 NOTE — PROGRESS NOTES
PT Evaluation     Today's date: 2024  Patient name: Carl Herbert  : 1975  MRN: [id number]  Referring provider: Nic Dias DPM  Dx:   Encounter Diagnosis     ICD-10-CM    1. Tendonitis, Achilles, left  M76.62 Ambulatory referral to Physical Therapy                     Assessment  Assessment details: Carl Herbertpresents with signs and symptoms consistent with Tendonitis, Achilles, left, with loss of range of motion, strength and spinal stabilization.  Presents with high reactivity.  Carl Herbert would benefit with physical therapy to address these impairments to return to prior level of function.     Impairments: abnormal or restricted ROM, impaired balance, impaired physical strength and pain with function    Goals  STG  Initiate HEP  Decrease pain by 50% in 3 weeks  Patient performing HEP 50% of time in 3 weeks  LTG  Independent with HEP  Decrease pain by 90% in 6 weeks  Patient performing HEP 90% of time in 6 weeks  FOTO >44     in 6 weeks     Plan  Planned therapy interventions: joint mobilization, manual therapy, neuromuscular re-education, patient education, balance, balance/weight bearing training, strengthening, stretching, functional ROM exercises, gait training, therapeutic exercise and home exercise program  Frequency: 2x week  Duration in visits: 12  Duration in weeks: 6  Treatment plan discussed with: patient        Subjective Evaluation    History of Present Illness  Mechanism of injury: Patient reports L > R foot pain that started 10 months ago, played basketball in 3/24  and after wards experienced left foot severe pain.  He is taking meloicam and voltarin gel, he is taking much better.  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  He has been stretching his gastroc and plantar-fascia.          Recurrent probem    Quality of life: good    Patient Goals  Patient goals for therapy: decreased pain, improved balance, increased motion, increased strength, independence with  ADLs/IADLs and return to sport/leisure activities    Pain  Current pain rating: 3  At best pain ratin  At worst pain ratin  Location: left foot  Quality: needle-like and knife-like  Relieving factors: rest  Aggravating factors: stair climbing and walking    Treatments  Current treatment: physical therapy        Objective     Tenderness   Left Ankle/Foot   Tenderness in the Achilles insertion.     Right Ankle/Foot   No tenderness in the Achilles insertion.     Active Range of Motion   Left Ankle/Foot   Dorsiflexion (ke): 5 degrees with pain  Plantar flexion: 80 degrees   Inversion: 30 degrees   Eversion: 15 degrees     Right Ankle/Foot   Dorsiflexion (ke): 20 degrees   Plantar flexion: 80 degrees   Inversion: 35 degrees   Eversion: 20 degrees     Strength/Myotome Testing     Left Ankle/Foot   Dorsiflexion: 4  Plantar flexion: 4-  Inversion: 4  Eversion: 4    Right Ankle/Foot   Dorsiflexion: 5  Plantar flexion: 5  Inversion: 5  Eversion: 5    Functional Assessment        Single Leg Stance   Left: 5 seconds  Right: 20 seconds             Precautions: Medical History    Diagnosis Date Comment Source   Allergic rhinitis      GERD (gastroesophageal reflux disease)      Herniated lumbar intervertebral disc      Migraines      Sinusitis            Manuals                                                                 Neuro Re-Ed                                                                                                        Ther Ex 48            Gastroc,Soleus  stretch 10x2            Toe Yoga 10x            Foot shortening 10x            Toe Towel grab 10x            Eccentric Gastroc 10x            Toe lifts 10x                                      Ther Activity                                       Gait Training                                       Modalities

## 2024-04-22 ENCOUNTER — OFFICE VISIT (OUTPATIENT)
Dept: PHYSICAL THERAPY | Facility: CLINIC | Age: 49
End: 2024-04-22
Payer: COMMERCIAL

## 2024-04-22 DIAGNOSIS — M76.62 TENDONITIS, ACHILLES, LEFT: Primary | ICD-10-CM

## 2024-04-22 PROCEDURE — 97110 THERAPEUTIC EXERCISES: CPT | Performed by: PHYSICAL THERAPIST

## 2024-04-22 PROCEDURE — 97140 MANUAL THERAPY 1/> REGIONS: CPT | Performed by: PHYSICAL THERAPIST

## 2024-04-22 NOTE — PROGRESS NOTES
Daily Note     Today's date: 2024  Patient name: Carl Herbert  : 1975  MRN: [id number]  Referring provider: Nic Dias DPM  Dx:   Encounter Diagnosis     ICD-10-CM    1. Tendonitis, Achilles, left  M76.62                      Subjective: Experiencing less pain      Objective: See treatment diary below      Assessment: Tolerated treatment well. Patient demonstrated fatigue post treatment and exhibited good technique with therapeutic exercises      Plan: Continue per plan of care.      Precautions: Medical History    Diagnosis Date Comment Source   Allergic rhinitis      GERD (gastroesophageal reflux disease)      Herniated lumbar intervertebral disc      Migraines      Sinusitis            Manuals             STM L achilles  mv                                                  Neuro Re-Ed                                                                                                        Ther Ex            Gastroc,Soleus  stretch 10x2            Toe Yoga 10x            Foot shortening 10x            Toe Towel grab 10x            Eccentric Gastroc 10x            Toe lifts 10x 20x           TB DF,PF,INV,EVR  RTB 4x10           Toe Raises knee Ext& Flex  2x10                                                  Gait Training                                       Modalities

## 2024-04-24 ENCOUNTER — OFFICE VISIT (OUTPATIENT)
Dept: GASTROENTEROLOGY | Facility: CLINIC | Age: 49
End: 2024-04-24
Payer: COMMERCIAL

## 2024-04-24 VITALS
BODY MASS INDEX: 30.69 KG/M2 | DIASTOLIC BLOOD PRESSURE: 93 MMHG | HEIGHT: 76 IN | WEIGHT: 252 LBS | SYSTOLIC BLOOD PRESSURE: 153 MMHG | HEART RATE: 85 BPM

## 2024-04-24 DIAGNOSIS — R14.0 FLATULENCE/GAS PAIN/BELCHING: ICD-10-CM

## 2024-04-24 DIAGNOSIS — R19.4 CHANGE IN BOWEL HABITS: Primary | ICD-10-CM

## 2024-04-24 PROCEDURE — 99204 OFFICE O/P NEW MOD 45 MIN: CPT | Performed by: INTERNAL MEDICINE

## 2024-04-24 RX ORDER — SODIUM, POTASSIUM,MAG SULFATES 17.5-3.13G
177 SOLUTION, RECONSTITUTED, ORAL ORAL ONCE
Qty: 177 ML | Refills: 0 | Status: SHIPPED | OUTPATIENT
Start: 2024-04-24 | End: 2024-04-24

## 2024-04-24 RX ORDER — DIPHENOXYLATE HYDROCHLORIDE AND ATROPINE SULFATE 2.5; .025 MG/1; MG/1
1 TABLET ORAL DAILY
COMMUNITY

## 2024-04-24 NOTE — PROGRESS NOTES
[facility] - Outpatient Consultation  Carl Herbert 48 y.o. male MRN: [id number]  Encounter: [id number]      PCP: Chriss Duncan MD  Referring: Deloris Cagle MD  [address] [place] [address]      ASSESSMENT AND PLAN:      1. Change in bowel habits  2. Flatulence/gas pain/belching  Chronic symptoms > 12 months, with exacerbation and presentation for medical management  Differential includes functional vs malabsorptive vs autoimmune or other  Obtain labs to assess for electrolyte abnormality, thyroid or other contributing  - Celiac Disease Antibody Profile; Future  - Giardia antigen; Future  - Pancreatic elastase, fecal; Future  - Stool Enteric Bacterial Panel by PCR; Future  - CBC; Future  - Comprehensive metabolic panel; Future  - TSH, 3rd generation with Free T4 reflex; Future  - Colonoscopy; Future, r/o malignancy, random bx for microscopic colitis  - EGD; Future, r/o celiac disease  - Na Sulfate-K Sulfate-Mg Sulf (Suprep Bowel Prep Kit) 17.5-3.13-1.6 GM/177ML SOLN; Take 177 mL by mouth once for 1 dose Take 177 mL by mouth once for 1 dose  Dispense: 177 mL; Refill: 0  - recommend psyllium fiber    ______________________________________________________________________    CC:  Chief Complaint   Patient presents with    New Patient Visit     Stomach issues , frequent movements, normal to loose/colon consult       HPI:      Patient is a 48-year-old male referred for abdominal pain, change in bowel habits with loose and diarrhea stools.  He has allergic rhinitis, lumbar radiculopathy/sciatica, asthma.  Since of change in bowel habits-she describes varying consistency to his stools with solid and liquid stools.  Stool frequency occurs 1-4 times in the morning.  He relates fecal urgency and belching.  This is worse when he has a busy day planned.  It is also exacerbated by intake of turkey, lean meats and [employer].  He denies any rectal bleeding, unintentional weight loss,  nocturnal stools.  He has made changes to his diet including avoiding soda, he has used over-the-counter Pepto-Bismol with some relief.    Previous use if ibuprofen 600 mg every 8 hours- now on mobic for back pain.    Abdominal Pain  Associated symptoms include belching, diarrhea, flatus, headaches and melena. Pertinent negatives include no anorexia, arthralgias, constipation, dysuria, fever, frequency, hematochezia, hematuria, myalgias, nausea, vomiting or weight loss.         REVIEW OF SYSTEMS:    CONSTITUTIONAL: Denies any fever, chills, rigors, and weight loss.  HEENT: No earache or tinnitus. Denies hearing loss or visual disturbances.  CARDIOVASCULAR: No chest pain or palpitations.   RESPIRATORY: Denies any cough, hemoptysis, shortness of breath or dyspnea on exertion.  GASTROINTESTINAL: As noted in the History of Present Illness.   GENITOURINARY: No problems with urination. Denies any hematuria or dysuria.  NEUROLOGIC: No dizziness or vertigo, denies headaches.   MUSCULOSKELETAL: Denies any muscle or joint pain.   SKIN: Denies skin rashes or itching.   ENDOCRINE: Denies excessive thirst. Denies intolerance to heat or cold.  PSYCHOSOCIAL: Denies depression or anxiety. Denies any recent memory loss.       Historical Information   Past Medical History:   Diagnosis Date    Allergic rhinitis     Herniated lumbar intervertebral disc     Migraines     Sinusitis      Past Surgical History:   Procedure Laterality Date    KNEE ARTHROSCOPY      THROAT SURGERY      WISDOM TOOTH EXTRACTION      X4     Social History   Social History     Substance and Sexual Activity   Alcohol Use Yes    Alcohol/week: 1.0 standard drink of alcohol    Types: 1 Cans of beer per week    Comment: social     Social History     Substance and Sexual Activity   Drug Use No     Social History     Tobacco Use   Smoking Status Former    Current packs/day: 0.00    Average packs/day: 0.5 packs/day for 10.0 years (5.0 ttl pk-yrs)    Types: Cigarettes     "Start date:     Quit date:     Years since quittin.3   Smokeless Tobacco Never     Family History   Problem Relation Age of Onset    Diabetes Mother     Diabetes Father     Hypertension Father     No Known Problems Sister     Mental illness Brother     Substance Abuse Brother        Meds/Allergies       Current Outpatient Medications:     Acetaminophen 325 MG CAPS    fluticasone (VERAMYST) 27.5 MCG/SPRAY nasal spray    Ibuprofen 200 MG CAPS    loratadine-pseudoephedrine (CLARITIN-D 24-HOUR)  mg per 24 hr tablet    meloxicam (Mobic) 15 mg tablet    multivitamin (THERAGRAN) TABS    Na Sulfate-K Sulfate-Mg Sulf (Suprep Bowel Prep Kit) 17.5-3.13-1.6 GM/177ML SOLN    valACYclovir (VALTREX) 1,000 mg tablet    Colchicine (Mitigare) 0.6 MG CAPS    guaiFENesin (MUCINEX) 600 mg 12 hr tablet    Allergies   Allergen Reactions    Betadine [Povidone Iodine] Rash           Objective     Blood pressure 153/93, pulse 85, height 6' 4\" (1.93 m), weight 114 kg (252 lb). Body mass index is 30.67 kg/m².     PHYSICAL EXAM:      General Appearance:   Alert, cooperative, no distress   HEENT:   Normocephalic, atraumatic, anicteric.     Neck:  Supple, symmetrical, trachea midline   Lungs:   Clear to auscultation bilaterally; no rales, rhonchi or wheezing; respirations unlabored    Heart::   Regular rate and rhythm; no murmur, rub, or gallop.   Abdomen:   Soft, non-tender, non-distended; normal bowel sounds; no masses, no organomegaly    Genitalia:   Deferred    Rectal:   Deferred    Extremities:  No cyanosis, clubbing or edema    Pulses:  2+ and symmetric    Skin:  No jaundice, rashes, or lesions    Lymph nodes:  No palpable cervical lymphadenopathy        Lab Results:     No results found for: \"WBC\", \"HGB\", \"HCT\", \"MCV\", \"PLT\"    Lab Results   Component Value Date    K 4.6 2021     2021    CO2 30 2021    BUN 20 2021    CREATININE 1.08 2021    CALCIUM 9.9 2021    AST 12 2021 " "   ALT 10 06/19/2021    ALKPHOS 59 06/19/2021       No results found for: \"INR\", \"PROTIME\"    I personally reviewed relevant labs    Radiology Results:   None relevant    Portions of the record may have been created with voice recognition software. Occasional wrong word or \"sound a like\" substitutions may have occurred due to the inherent limitations of voice recognition software. Read the chart carefully and recognize, using context, where substitutions have occurred.        "

## 2024-04-24 NOTE — PATIENT INSTRUCTIONS
For your bowel habits, as we discussed during today's visit,  - I would recommend starting psyllium, fiber supplementation.  This can be done with use of Konsyl, Citrucil, Metamucil or Benefiber fiber, which can be purchased over-the-counter.  I would recommend starting slow with 1 tsp mixed into a large glass of water, once a day, and increasing to goal of 1 tbsp mixed into a large glass of water per day.  - this helps with both diarrhea and constipation, helping to bulk the stool, and should not cause constipation or diarrhea, but treat both  - the only side effect of this can be bloating, if this occurs, cut down on the dose, and increase your water intake or alternatively, consider switching to an alternative as listed above    Scheduled date of EGD/colonoscopy (as of today): 5/28  Physician performing EGD/colonoscopy: Tasha  [facility] of EGD/colonoscopy: [facility]  Desired bowel prep reviewed with patient: Suprep  Instructions reviewed with patient by: ST  Clearances:  none

## 2024-05-09 ENCOUNTER — OFFICE VISIT (OUTPATIENT)
Dept: PHYSICAL THERAPY | Facility: CLINIC | Age: 49
End: 2024-05-09
Payer: COMMERCIAL

## 2024-05-09 DIAGNOSIS — M76.62 TENDONITIS, ACHILLES, LEFT: Primary | ICD-10-CM

## 2024-05-09 PROCEDURE — 97112 NEUROMUSCULAR REEDUCATION: CPT | Performed by: PHYSICAL THERAPIST

## 2024-05-09 PROCEDURE — 97110 THERAPEUTIC EXERCISES: CPT | Performed by: PHYSICAL THERAPIST

## 2024-05-09 PROCEDURE — 97140 MANUAL THERAPY 1/> REGIONS: CPT | Performed by: PHYSICAL THERAPIST

## 2024-05-09 NOTE — PROGRESS NOTES
Daily Note     Today's date: 2024  Patient name: Carl Herbert  : 1975  MRN: [id number]  Referring provider: Nic Dias DPM  Dx:   Encounter Diagnosis     ICD-10-CM    1. Tendonitis, Achilles, left  M76.62                      Subjective: Overall far less pain and more tolerance to being on my feet at work.      Objective: See treatment diary below      Assessment: Tolerated treatment well. Patient demonstrated fatigue post treatment and exhibited good technique with therapeutic exercises      Plan: Continue per plan of care.      Precautions: Medical History    Diagnosis Date Comment Source   Allergic rhinitis      GERD (gastroesophageal reflux disease)      Herniated lumbar intervertebral disc      Migraines      Sinusitis            Manuals            STM L achilles  mv mv                                                 Neuro Re-Ed             [place] bal walkouts   #14 4x10          Squats on air cushions   20x          Bal kick on cushion   20x                                                              Ther Ex            Gastroc,Soleus  stretch 10x2            Toe Yoga 10x            Foot shortening 10x            Toe Towel grab 10x            Eccentric Gastroc 10x  20x          Toe lifts 10x 20x           TB DF,PF,INV,EVR  RTB 4x10 MRE          Toe Raises knee Ext& Flex  2x10                                                  Gait Training                                       Modalities

## 2024-05-20 ENCOUNTER — OFFICE VISIT (OUTPATIENT)
Age: 49
End: 2024-05-20
Payer: COMMERCIAL

## 2024-05-20 ENCOUNTER — OFFICE VISIT (OUTPATIENT)
Dept: PHYSICAL THERAPY | Facility: CLINIC | Age: 49
End: 2024-05-20
Payer: COMMERCIAL

## 2024-05-20 VITALS
HEART RATE: 67 BPM | BODY MASS INDEX: 30.69 KG/M2 | SYSTOLIC BLOOD PRESSURE: 127 MMHG | DIASTOLIC BLOOD PRESSURE: 84 MMHG | HEIGHT: 76 IN | WEIGHT: 252 LBS

## 2024-05-20 DIAGNOSIS — M76.62 TENDONITIS, ACHILLES, LEFT: Primary | ICD-10-CM

## 2024-05-20 PROCEDURE — 97112 NEUROMUSCULAR REEDUCATION: CPT | Performed by: PHYSICAL THERAPIST

## 2024-05-20 PROCEDURE — 97110 THERAPEUTIC EXERCISES: CPT | Performed by: PHYSICAL THERAPIST

## 2024-05-20 PROCEDURE — 99212 OFFICE O/P EST SF 10 MIN: CPT | Performed by: STUDENT IN AN ORGANIZED HEALTH CARE EDUCATION/TRAINING PROGRAM

## 2024-05-20 NOTE — PROGRESS NOTES
PT Discharge    Today's date: 2024  Patient name: Carl Herbert  : 1975  MRN: [id number]  Referring provider: Nic Dias DPM  Dx:   Encounter Diagnosis     ICD-10-CM    1. Tendonitis, Achilles, left  M76.62                      Assessment    Assessment details: Carl Herbert has been seen for Tendonitis, Achilles, left  (primary encounter diagnosis),and has met the goals of physical therapy.And is independent with a HEP.           Subjective Evaluation    History of Present Illness  Mechanism of injury: Patient reports no achilles pain and no limitations.        Objective     Active Range of Motion   Left Ankle/Foot   Normal active range of motion    Right Ankle/Foot   Normal active range of motion    Strength/Myotome Testing     Left Ankle/Foot   Normal strength    Right Ankle/Foot   Normal strength                 Manuals           STM L achilles  mv mv mv                                                Neuro Re-Ed             [place] bal walkouts   #14 4x10 #14  4x10         Squats on air cushions   20x          Bal kick on cushion   20x          [place] off set bal walkouts    #7 2x10                                                Ther Ex            Gastroc,Soleus  stretch 10x2            Toe Yoga 10x            Foot shortening 10x            Toe Towel grab 10x            Eccentric Gastroc 10x  20x 15x         Toe lifts 10x 20x           TB DF,PF,INV,EVR  RTB 4x10 MRE          Toe Raises knee Ext& Flex  2x10  20x

## 2024-05-20 NOTE — PROGRESS NOTES
This patient was seen on 5/20/2024.    My role is Foot , Ankle, and Wound Specialist    ASSESSMENT     Diagnoses and all orders for this visit:    Tendonitis, Achilles, left           Problem List Items Addressed This Visit    None  Visit Diagnoses       Tendonitis, Achilles, left    -  Primary            PLAN  -Patient was educated regarding their condition.  -Continue Voltaren gel, supportive sneakers with over-the-counter inserts  -Continue home exercise program as outlined by physical therapy  -RTC as needed for new or worsening podiatric concerns    SUBJECTIVE    Chief Complaint:  Left ankle/foot pain     Patient ID: Carl Herbert     3/27/2024: Carl is a pleasant 48-year-old male who presents today with left Achilles tendon pain.  He states that the right is only minorly painful at the left is much worse.  He states that this has been overall going on since summer.  He states that the pain is mostly around the back of the left ankle.  He states that recently he played basketball and did end up getting a blood blister to his left plantar foot as well as ankle pain and swelling, this eventually healed.  Of note he does have a history of gout and takes Mitigare for flareups.  For his Achilles tendon pain he is also attempted Advil, Tylenol, Aleve as well as seeing a chiropractor.  None of this fully alleviated his pain.    5/20/2024: Carl is overall doing much better today.  He relates 75% decrease in pain compared to his last visit.  He states that his left Achilles tendon does still act up when he is overusing it or doing increased physical activity.  He states that physical therapy has been going well and he has a good  on his home exercise program.        The following portions of the patient's history were reviewed and updated as appropriate: allergies, current medications, past family history, past medical history, past social history, past surgical history and problem list.    Review of Systems  "  Constitutional: Negative.    Respiratory: Negative.     Cardiovascular: Negative.    Gastrointestinal: Negative.    Genitourinary: Negative.    Musculoskeletal:  Positive for myalgias.   Skin: Negative.          OBJECTIVE      /84   Pulse 67   Ht 6' 4\" (1.93 m)   Wt 114 kg (252 lb)   BMI 30.67 kg/m²        Physical Exam  Constitutional:       Appearance: Normal appearance.   HENT:      Head: Normocephalic and atraumatic.   Eyes:      General:         Right eye: No discharge.         Left eye: No discharge.   Cardiovascular:      Rate and Rhythm: Normal rate and regular rhythm.      Pulses:           Dorsalis pedis pulses are 2+ on the right side and 2+ on the left side.        Posterior tibial pulses are 2+ on the right side and 2+ on the left side.   Pulmonary:      Effort: Pulmonary effort is normal.      Breath sounds: Normal breath sounds.   Skin:     General: Skin is warm.      Capillary Refill: Capillary refill takes less than 2 seconds.   Neurological:      Mental Status: He is alert and oriented to person, place, and time.      Sensory: Sensation is intact. No sensory deficit.   Psychiatric:         Mood and Affect: Mood normal.       MSK:  -No pain on palpation noted today  -No gross deformities noted   -MMT is 5/5 to all muscle compartments of the lower extremity  -Ankle dorsiflexion <10deg with knee extended    Derm:  -No lesions, abrasions, or open wounds noted  -No noted interdigital maceration, peeling, malodor  -No callus formation noted on exam   "

## 2024-06-27 ENCOUNTER — HOSPITAL ENCOUNTER (OUTPATIENT)
Dept: GASTROENTEROLOGY | Facility: AMBULARY SURGERY CENTER | Age: 49
Setting detail: OUTPATIENT SURGERY
End: 2024-06-27
Attending: INTERNAL MEDICINE
Payer: COMMERCIAL

## 2024-06-27 ENCOUNTER — ANESTHESIA EVENT (OUTPATIENT)
Dept: GASTROENTEROLOGY | Facility: AMBULARY SURGERY CENTER | Age: 49
End: 2024-06-27

## 2024-06-27 ENCOUNTER — ANESTHESIA (OUTPATIENT)
Dept: GASTROENTEROLOGY | Facility: AMBULARY SURGERY CENTER | Age: 49
End: 2024-06-27

## 2024-06-27 VITALS
DIASTOLIC BLOOD PRESSURE: 66 MMHG | SYSTOLIC BLOOD PRESSURE: 125 MMHG | OXYGEN SATURATION: 98 % | TEMPERATURE: 97.9 F | HEART RATE: 80 BPM | RESPIRATION RATE: 18 BRPM

## 2024-06-27 DIAGNOSIS — K22.10 EROSIVE ESOPHAGITIS: Primary | ICD-10-CM

## 2024-06-27 DIAGNOSIS — R19.4 CHANGE IN BOWEL HABITS: ICD-10-CM

## 2024-06-27 DIAGNOSIS — R14.0 FLATULENCE/GAS PAIN/BELCHING: ICD-10-CM

## 2024-06-27 PROCEDURE — 43239 EGD BIOPSY SINGLE/MULTIPLE: CPT | Performed by: INTERNAL MEDICINE

## 2024-06-27 PROCEDURE — 88313 SPECIAL STAINS GROUP 2: CPT | Performed by: PATHOLOGY

## 2024-06-27 PROCEDURE — 88305 TISSUE EXAM BY PATHOLOGIST: CPT | Performed by: PATHOLOGY

## 2024-06-27 PROCEDURE — 88341 IMHCHEM/IMCYTCHM EA ADD ANTB: CPT | Performed by: PATHOLOGY

## 2024-06-27 PROCEDURE — 88342 IMHCHEM/IMCYTCHM 1ST ANTB: CPT | Performed by: PATHOLOGY

## 2024-06-27 PROCEDURE — 45385 COLONOSCOPY W/LESION REMOVAL: CPT | Performed by: INTERNAL MEDICINE

## 2024-06-27 RX ORDER — PROPOFOL 10 MG/ML
INJECTION, EMULSION INTRAVENOUS AS NEEDED
Status: DISCONTINUED | OUTPATIENT
Start: 2024-06-27 | End: 2024-06-27

## 2024-06-27 RX ORDER — SODIUM CHLORIDE, SODIUM LACTATE, POTASSIUM CHLORIDE, CALCIUM CHLORIDE 600; 310; 30; 20 MG/100ML; MG/100ML; MG/100ML; MG/100ML
125 INJECTION, SOLUTION INTRAVENOUS CONTINUOUS
Status: DISCONTINUED | OUTPATIENT
Start: 2024-06-27 | End: 2024-07-01 | Stop reason: HOSPADM

## 2024-06-27 RX ORDER — OMEPRAZOLE 40 MG/1
40 CAPSULE, DELAYED RELEASE ORAL DAILY
Qty: 90 CAPSULE | Refills: 3 | Status: SHIPPED | OUTPATIENT
Start: 2024-06-27

## 2024-06-27 RX ORDER — LIDOCAINE HYDROCHLORIDE 10 MG/ML
INJECTION, SOLUTION EPIDURAL; INFILTRATION; INTRACAUDAL; PERINEURAL AS NEEDED
Status: DISCONTINUED | OUTPATIENT
Start: 2024-06-27 | End: 2024-06-27

## 2024-06-27 RX ADMIN — PROPOFOL 30 MG: 10 INJECTION, EMULSION INTRAVENOUS at 09:22

## 2024-06-27 RX ADMIN — LIDOCAINE HYDROCHLORIDE 50 MG: 10 INJECTION, SOLUTION EPIDURAL; INFILTRATION; INTRACAUDAL; PERINEURAL at 08:55

## 2024-06-27 RX ADMIN — PROPOFOL 30 MG: 10 INJECTION, EMULSION INTRAVENOUS at 09:14

## 2024-06-27 RX ADMIN — PROPOFOL 150 MG: 10 INJECTION, EMULSION INTRAVENOUS at 08:54

## 2024-06-27 RX ADMIN — SODIUM CHLORIDE, SODIUM LACTATE, POTASSIUM CHLORIDE, AND CALCIUM CHLORIDE: .6; .31; .03; .02 INJECTION, SOLUTION INTRAVENOUS at 08:38

## 2024-06-27 RX ADMIN — PROPOFOL 30 MG: 10 INJECTION, EMULSION INTRAVENOUS at 09:09

## 2024-06-27 RX ADMIN — PROPOFOL 30 MG: 10 INJECTION, EMULSION INTRAVENOUS at 09:06

## 2024-06-27 RX ADMIN — PROPOFOL 30 MG: 10 INJECTION, EMULSION INTRAVENOUS at 09:02

## 2024-06-27 RX ADMIN — PROPOFOL 30 MG: 10 INJECTION, EMULSION INTRAVENOUS at 09:26

## 2024-06-27 RX ADMIN — PROPOFOL 50 MG: 10 INJECTION, EMULSION INTRAVENOUS at 08:58

## 2024-06-27 RX ADMIN — PROPOFOL 30 MG: 10 INJECTION, EMULSION INTRAVENOUS at 09:18

## 2024-06-27 RX ADMIN — PROPOFOL 30 MG: 10 INJECTION, EMULSION INTRAVENOUS at 09:07

## 2024-06-27 NOTE — ANESTHESIA POSTPROCEDURE EVALUATION
Post-Op Assessment Note    CV Status:  Stable    Pain management: adequate       Mental Status:  Alert and awake   Hydration Status:  Euvolemic   PONV Controlled:  Controlled   Airway Patency:  Patent     Post Op Vitals Reviewed: Yes    No anethesia notable event occurred.    Staff: CRNA               BP   134/62   Temp   98.7   Pulse  74   Resp   18   SpO2   99

## 2024-06-27 NOTE — ANESTHESIA PREPROCEDURE EVALUATION
Procedure:  COLONOSCOPY  EGD    Relevant Problems   MUSCULOSKELETAL   (+) Back pain   (+) Sciatica of right side      PULMONARY   (+) Uncomplicated asthma        Physical Exam    Airway    Mallampati score: II  TM Distance: >3 FB  Neck ROM: full     Dental   Comment: Denies loose teeth     Cardiovascular  Cardiovascular exam normal    Pulmonary  Pulmonary exam normal     Other Findings  Portions of exam deferred due to low yield and/or unknown COVID status      Anesthesia Plan  ASA Score- 2     Anesthesia Type- IV sedation with anesthesia with ASA Monitors.         Additional Monitors:     Airway Plan:            Plan Factors-Exercise tolerance (METS): >4 METS.    Chart reviewed.   Existing labs reviewed. Patient summary reviewed.    Patient is not a current smoker.              Induction- intravenous.    Postoperative Plan-         Informed Consent- Anesthetic plan and risks discussed with patient.  I personally reviewed this patient with the CRNA. Discussed and agreed on the Anesthesia Plan with the CRNA..

## 2024-06-27 NOTE — H&P
History and Physical - [facility]  Carl Herbert 49 y.o. male MRN: [id number]                  HPI: Carl Herbert is a 49 y.o. year old male who presents for change in bowel habits, gas/belching.      REVIEW OF SYSTEMS: Per the HPI, and otherwise unremarkable.    Historical Information   Past Medical History:   Diagnosis Date    Allergic rhinitis     Herniated lumbar intervertebral disc     Migraines     Sinusitis      Past Surgical History:   Procedure Laterality Date    KNEE ARTHROSCOPY      THROAT SURGERY      WISDOM TOOTH EXTRACTION      X4     Social History   Social History     Substance and Sexual Activity   Alcohol Use Yes    Alcohol/week: 1.0 standard drink of alcohol    Types: 1 Cans of beer per week    Comment: social     Social History     Substance and Sexual Activity   Drug Use No     Social History     Tobacco Use   Smoking Status Former    Current packs/day: 0.00    Average packs/day: 0.5 packs/day for 10.0 years (5.0 ttl pk-yrs)    Types: Cigarettes    Start date:     Quit date:     Years since quittin.5   Smokeless Tobacco Never     Family History   Problem Relation Age of Onset    Diabetes Mother     Diabetes Father     Hypertension Father     No Known Problems Sister     Mental illness Brother     Substance Abuse Brother        Meds/Allergies       Current Outpatient Medications:     fluticasone (VERAMYST) 27.5 MCG/SPRAY nasal spray    Ibuprofen 200 MG CAPS    meloxicam (Mobic) 15 mg tablet    multivitamin (THERAGRAN) TABS    Acetaminophen 325 MG CAPS    Colchicine (Mitigare) 0.6 MG CAPS    guaiFENesin (MUCINEX) 600 mg 12 hr tablet    loratadine-pseudoephedrine (CLARITIN-D 24-HOUR)  mg per 24 hr tablet    Na Sulfate-K Sulfate-Mg Sulf (Suprep Bowel Prep Kit) 17.5-3.13-1.6 GM/177ML SOLN    valACYclovir (VALTREX) 1,000 mg tablet    Current Facility-Administered Medications:     lactated ringers infusion, 125 mL/hr, Intravenous, Continuous    Allergies    Allergen Reactions    Betadine [Povidone Iodine] Rash       Objective     /80   Pulse 74   Temp 97.9 °F (36.6 °C) (Temporal)   Resp 18   SpO2 98%       PHYSICAL EXAM    Gen: NAD  Head: NCAT  CV: RRR  CHEST: Clear  ABD: soft, NT/ND  EXT: no edema      ASSESSMENT/PLAN:  This is a 49 y.o. year old male here for EGD & colonoscopy, and he is stable and optimized for his procedure.

## 2024-07-02 PROCEDURE — 88341 IMHCHEM/IMCYTCHM EA ADD ANTB: CPT | Performed by: PATHOLOGY

## 2024-07-02 PROCEDURE — 88305 TISSUE EXAM BY PATHOLOGIST: CPT | Performed by: PATHOLOGY

## 2024-07-02 PROCEDURE — 88342 IMHCHEM/IMCYTCHM 1ST ANTB: CPT | Performed by: PATHOLOGY

## 2024-07-02 PROCEDURE — 88313 SPECIAL STAINS GROUP 2: CPT | Performed by: PATHOLOGY

## 2024-07-02 NOTE — RESULT ENCOUNTER NOTE
Spoke with patient via phone.  Informed him of providers message.  Patient verbalized understanding. Patient thanked me for the call.    Colon 3 year recall set.

## 2024-08-20 LAB
ALBUMIN SERPL-MCNC: 4.4 G/DL (ref 4.1–5.1)
ALP SERPL-CCNC: 76 IU/L (ref 44–121)
ALT SERPL-CCNC: 11 IU/L (ref 0–44)
AST SERPL-CCNC: 15 IU/L (ref 0–40)
BASOPHILS # BLD AUTO: 0 X10E3/UL (ref 0–0.2)
BASOPHILS NFR BLD AUTO: 0 %
BILIRUB SERPL-MCNC: 0.5 MG/DL (ref 0–1.2)
BUN SERPL-MCNC: 24 MG/DL (ref 6–24)
BUN/CREAT SERPL: 20 (ref 9–20)
CALCIUM SERPL-MCNC: 10.1 MG/DL (ref 8.7–10.2)
CHLORIDE SERPL-SCNC: 103 MMOL/L (ref 96–106)
CO2 SERPL-SCNC: 24 MMOL/L (ref 20–29)
CREAT SERPL-MCNC: 1.22 MG/DL (ref 0.76–1.27)
EGFR: 73 ML/MIN/1.73
ENDOMYSIUM IGA SER QL: NEGATIVE
EOSINOPHIL # BLD AUTO: 0.2 X10E3/UL (ref 0–0.4)
EOSINOPHIL NFR BLD AUTO: 2 %
ERYTHROCYTE [DISTWIDTH] IN BLOOD BY AUTOMATED COUNT: 12.1 % (ref 11.6–15.4)
GLOBULIN SER-MCNC: 2.9 G/DL (ref 1.5–4.5)
GLUCOSE SERPL-MCNC: 108 MG/DL (ref 70–99)
HCT VFR BLD AUTO: 44.8 % (ref 37.5–51)
HGB BLD-MCNC: 14.6 G/DL (ref 13–17.7)
IGA SERPL-MCNC: 235 MG/DL (ref 90–386)
IMM GRANULOCYTES # BLD: 0 X10E3/UL (ref 0–0.1)
IMM GRANULOCYTES NFR BLD: 0 %
LYMPHOCYTES # BLD AUTO: 1.6 X10E3/UL (ref 0.7–3.1)
LYMPHOCYTES NFR BLD AUTO: 20 %
MCH RBC QN AUTO: 29.1 PG (ref 26.6–33)
MCHC RBC AUTO-ENTMCNC: 32.6 G/DL (ref 31.5–35.7)
MCV RBC AUTO: 89 FL (ref 79–97)
MONOCYTES # BLD AUTO: 0.6 X10E3/UL (ref 0.1–0.9)
MONOCYTES NFR BLD AUTO: 8 %
NEUTROPHILS # BLD AUTO: 5.4 X10E3/UL (ref 1.4–7)
NEUTROPHILS NFR BLD AUTO: 70 %
PLATELET # BLD AUTO: 268 X10E3/UL (ref 150–450)
POTASSIUM SERPL-SCNC: 4.3 MMOL/L (ref 3.5–5.2)
PROT SERPL-MCNC: 7.3 G/DL (ref 6–8.5)
RBC # BLD AUTO: 5.01 X10E6/UL (ref 4.14–5.8)
SODIUM SERPL-SCNC: 142 MMOL/L (ref 134–144)
TSH SERPL DL<=0.005 MIU/L-ACNC: 1.25 UIU/ML (ref 0.45–4.5)
TTG IGA SER-ACNC: <2 U/ML (ref 0–3)
WBC # BLD AUTO: 7.9 X10E3/UL (ref 3.4–10.8)

## 2024-08-22 DIAGNOSIS — A09 DIARRHEA OF INFECTIOUS ORIGIN: Primary | ICD-10-CM

## 2024-08-22 LAB
ADV 40+41 DNA STL QL NAA+NON-PROBE: NOT DETECTED
ASTRO TYP 1-8 RNA STL QL NAA+NON-PROBE: NOT DETECTED
C CAYETANENSIS DNA STL QL NAA+NON-PROBE: NOT DETECTED
C COLI+JEJ+UPSA DNA STL QL NAA+NON-PROBE: NOT DETECTED
C DIF TOX TCDA+TCDB STL QL NAA+NON-PROBE: DETECTED
CRYPTOSP DNA STL QL NAA+NON-PROBE: NOT DETECTED
E COLI O157 DNA STL QL NAA+NON-PROBE: ABNORMAL
E HISTOLYT DNA STL QL NAA+NON-PROBE: NOT DETECTED
EAEC PAA PLAS AGGR+AATA ST NAA+NON-PRB: NOT DETECTED
EC STX1+STX2 GENES STL QL NAA+NON-PROBE: NOT DETECTED
ELASTASE PANC STL-MCNT: >800 UG ELAST./G
EPEC EAE GENE STL QL NAA+NON-PROBE: DETECTED
ETEC LTA+ST1A+ST1B TOX ST NAA+NON-PROBE: NOT DETECTED
G LAMBLIA AG STL QL IA: NEGATIVE
G LAMBLIA DNA STL QL NAA+NON-PROBE: NOT DETECTED
NOROVIRUS GI+II RNA STL QL NAA+NON-PROBE: NOT DETECTED
P SHIGELLOIDES DNA STL QL NAA+NON-PROBE: NOT DETECTED
RVA RNA STL QL NAA+NON-PROBE: NOT DETECTED
S ENT+BONG DNA STL QL NAA+NON-PROBE: NOT DETECTED
SAPO I+II+IV+V RNA STL QL NAA+NON-PROBE: NOT DETECTED
SHIGELLA SP+EIEC IPAH ST NAA+NON-PROBE: NOT DETECTED
V CHOL+PARA+VUL DNA STL QL NAA+NON-PROBE: NOT DETECTED
V CHOLERAE DNA STL QL NAA+NON-PROBE: NOT DETECTED
Y ENTEROCOL DNA STL QL NAA+NON-PROBE: NOT DETECTED

## 2024-08-26 ENCOUNTER — TELEPHONE (OUTPATIENT)
Dept: GASTROENTEROLOGY | Facility: CLINIC | Age: 49
End: 2024-08-26

## 2024-08-26 NOTE — TELEPHONE ENCOUNTER
Pt is scheduled for appointment on 8/26/24. Provider is out sick and will need to be rescheduled.

## 2024-09-26 ENCOUNTER — HOSPITAL ENCOUNTER (EMERGENCY)
Facility: HOSPITAL | Age: 49
Discharge: HOME | End: 2024-09-27
Attending: EMERGENCY MEDICINE | Admitting: EMERGENCY MEDICINE
Payer: COMMERCIAL

## 2024-09-26 ENCOUNTER — TELEPHONE (OUTPATIENT)
Dept: CT IMAGING | Facility: HOSPITAL | Age: 49
End: 2024-09-26

## 2024-09-26 ENCOUNTER — APPOINTMENT (EMERGENCY)
Dept: CT IMAGING | Facility: HOSPITAL | Age: 49
End: 2024-09-26
Payer: COMMERCIAL

## 2024-09-26 DIAGNOSIS — R82.4 KETONURIA: ICD-10-CM

## 2024-09-26 DIAGNOSIS — N13.30 HYDROURETERONEPHROSIS: ICD-10-CM

## 2024-09-26 DIAGNOSIS — N20.1 CALCULUS OF URETEROVESICAL JUNCTION (UVJ): Primary | ICD-10-CM

## 2024-09-26 DIAGNOSIS — R11.0 NAUSEA: ICD-10-CM

## 2024-09-26 DIAGNOSIS — R74.8 ELEVATED LIVER ENZYMES: ICD-10-CM

## 2024-09-26 DIAGNOSIS — R31.9 HEMATURIA: ICD-10-CM

## 2024-09-26 LAB
ALBUMIN SERPL BCG-MCNC: 3.8 G/DL (ref 3.5–5)
ALP SERPL-CCNC: 99 U/L (ref 34–104)
ALT SERPL W P-5'-P-CCNC: 383 U/L (ref 7–52)
ANION GAP SERPL CALCULATED.3IONS-SCNC: 9 MMOL/L (ref 4–13)
APAP SERPL-MCNC: 5 UG/ML (ref 10–20)
APTT PPP: 27 SECONDS (ref 23–34)
AST SERPL W P-5'-P-CCNC: 303 U/L (ref 5–45)
BACTERIA UR QL AUTO: ABNORMAL /HPF
BASOPHILS # BLD MANUAL: 0.08 THOUSAND/UL (ref 0–0.1)
BASOPHILS NFR MAR MANUAL: 1 % (ref 0–1)
BILIRUB SERPL-MCNC: 0.64 MG/DL (ref 0.2–1)
BILIRUB UR QL STRIP: NEGATIVE
BUN SERPL-MCNC: 11 MG/DL (ref 5–25)
CALCIUM SERPL-MCNC: 9.2 MG/DL (ref 8.4–10.2)
CHLORIDE SERPL-SCNC: 100 MMOL/L (ref 96–108)
CK SERPL-CCNC: 55 U/L (ref 39–192)
CLARITY UR: ABNORMAL
CO2 SERPL-SCNC: 27 MMOL/L (ref 21–32)
COLOR UR: YELLOW
CREAT SERPL-MCNC: 1.19 MG/DL (ref 0.6–1.3)
EOSINOPHIL # BLD MANUAL: 0 THOUSAND/UL (ref 0–0.4)
EOSINOPHIL NFR BLD MANUAL: 0 % (ref 0–6)
ERYTHROCYTE [DISTWIDTH] IN BLOOD BY AUTOMATED COUNT: 13.1 % (ref 11.6–15.1)
ETHANOL SERPL-MCNC: <10 MG/DL
GLUCOSE SERPL-MCNC: 102 MG/DL (ref 65–140)
GLUCOSE UR STRIP-MCNC: NEGATIVE MG/DL
HCT VFR BLD AUTO: 38.3 % (ref 36.5–46.1)
HGB BLD-MCNC: 12.8 G/DL (ref 12–15.4)
HGB UR QL STRIP.AUTO: ABNORMAL
INR PPP: 1.11 (ref 0.85–1.19)
KETONES UR STRIP-MCNC: ABNORMAL MG/DL
LACTATE SERPL-SCNC: 0.8 MMOL/L (ref 0.5–2)
LEUKOCYTE ESTERASE UR QL STRIP: ABNORMAL
LIPASE SERPL-CCNC: 57 U/L (ref 11–82)
LYMPHOCYTES # BLD AUTO: 1.75 THOUSAND/UL (ref 0.6–4.47)
LYMPHOCYTES # BLD AUTO: 21 % (ref 14–44)
MCH RBC QN AUTO: 28.8 PG (ref 26.8–34.3)
MCHC RBC AUTO-ENTMCNC: 33.4 G/DL (ref 31.4–37.4)
MCV RBC AUTO: 86 FL (ref 82–98)
MONOCYTES # BLD AUTO: 0.46 THOUSAND/UL (ref 0–1.22)
MONOCYTES NFR BLD: 6 % (ref 4–12)
MUCOUS THREADS UR QL AUTO: ABNORMAL
NEUTROPHILS # BLD MANUAL: 5.34 THOUSAND/UL (ref 1.85–7.62)
NEUTS SEG NFR BLD AUTO: 70 % (ref 43–75)
NITRITE UR QL STRIP: NEGATIVE
NON-SQ EPI CELLS URNS QL MICRO: ABNORMAL /HPF
PH UR STRIP.AUTO: 6 [PH]
PLATELET # BLD AUTO: 180 THOUSANDS/UL (ref 149–390)
PLATELET BLD QL SMEAR: ADEQUATE
PMV BLD AUTO: 9.5 FL (ref 8.9–12.7)
POTASSIUM SERPL-SCNC: 3.6 MMOL/L (ref 3.5–5.3)
PROT SERPL-MCNC: 6.7 G/DL (ref 6.4–8.4)
PROT UR STRIP-MCNC: ABNORMAL MG/DL
PROTHROMBIN TIME: 15 SECONDS (ref 12.3–15)
RBC # BLD AUTO: 4.45 MILLION/UL (ref 3.88–5.12)
RBC #/AREA URNS AUTO: ABNORMAL /HPF
RBC MORPH BLD: NORMAL
SALICYLATES SERPL-MCNC: <5 MG/DL (ref 3–20)
SODIUM SERPL-SCNC: 136 MMOL/L (ref 135–147)
SP GR UR STRIP.AUTO: 1.03 (ref 1–1.03)
UROBILINOGEN UR STRIP-ACNC: <2 MG/DL
VARIANT LYMPHS # BLD AUTO: 2 %
WBC # BLD AUTO: 7.63 THOUSAND/UL (ref 4.31–10.16)
WBC #/AREA URNS AUTO: ABNORMAL /HPF

## 2024-09-26 PROCEDURE — 99285 EMERGENCY DEPT VISIT HI MDM: CPT | Performed by: PHYSICIAN ASSISTANT

## 2024-09-26 PROCEDURE — 85027 COMPLETE CBC AUTOMATED: CPT | Performed by: PHYSICIAN ASSISTANT

## 2024-09-26 PROCEDURE — 83605 ASSAY OF LACTIC ACID: CPT | Performed by: PHYSICIAN ASSISTANT

## 2024-09-26 PROCEDURE — 36415 COLL VENOUS BLD VENIPUNCTURE: CPT | Performed by: PHYSICIAN ASSISTANT

## 2024-09-26 PROCEDURE — 99284 EMERGENCY DEPT VISIT MOD MDM: CPT

## 2024-09-26 PROCEDURE — 74177 CT ABD & PELVIS W/CONTRAST: CPT

## 2024-09-26 PROCEDURE — 82077 ASSAY SPEC XCP UR&BREATH IA: CPT | Performed by: PHYSICIAN ASSISTANT

## 2024-09-26 PROCEDURE — 80053 COMPREHEN METABOLIC PANEL: CPT | Performed by: PHYSICIAN ASSISTANT

## 2024-09-26 PROCEDURE — 85610 PROTHROMBIN TIME: CPT | Performed by: PHYSICIAN ASSISTANT

## 2024-09-26 PROCEDURE — 96374 THER/PROPH/DIAG INJ IV PUSH: CPT

## 2024-09-26 PROCEDURE — 85730 THROMBOPLASTIN TIME PARTIAL: CPT | Performed by: PHYSICIAN ASSISTANT

## 2024-09-26 PROCEDURE — 80074 ACUTE HEPATITIS PANEL: CPT | Performed by: PHYSICIAN ASSISTANT

## 2024-09-26 PROCEDURE — 80179 DRUG ASSAY SALICYLATE: CPT | Performed by: PHYSICIAN ASSISTANT

## 2024-09-26 PROCEDURE — 96361 HYDRATE IV INFUSION ADD-ON: CPT

## 2024-09-26 PROCEDURE — 96376 TX/PRO/DX INJ SAME DRUG ADON: CPT

## 2024-09-26 PROCEDURE — 80143 DRUG ASSAY ACETAMINOPHEN: CPT | Performed by: PHYSICIAN ASSISTANT

## 2024-09-26 PROCEDURE — 85007 BL SMEAR W/DIFF WBC COUNT: CPT | Performed by: PHYSICIAN ASSISTANT

## 2024-09-26 PROCEDURE — 82550 ASSAY OF CK (CPK): CPT | Performed by: PHYSICIAN ASSISTANT

## 2024-09-26 PROCEDURE — 83690 ASSAY OF LIPASE: CPT | Performed by: PHYSICIAN ASSISTANT

## 2024-09-26 PROCEDURE — 81001 URINALYSIS AUTO W/SCOPE: CPT | Performed by: PHYSICIAN ASSISTANT

## 2024-09-26 PROCEDURE — 96375 TX/PRO/DX INJ NEW DRUG ADDON: CPT

## 2024-09-26 RX ORDER — HYDROMORPHONE HCL/PF 1 MG/ML
0.5 SYRINGE (ML) INJECTION ONCE
Status: DISCONTINUED | OUTPATIENT
Start: 2024-09-26 | End: 2024-09-27 | Stop reason: HOSPADM

## 2024-09-26 RX ORDER — ONDANSETRON 2 MG/ML
4 INJECTION INTRAMUSCULAR; INTRAVENOUS ONCE
Status: COMPLETED | OUTPATIENT
Start: 2024-09-26 | End: 2024-09-26

## 2024-09-26 RX ORDER — KETOROLAC TROMETHAMINE 30 MG/ML
15 INJECTION, SOLUTION INTRAMUSCULAR; INTRAVENOUS ONCE
Status: COMPLETED | OUTPATIENT
Start: 2024-09-26 | End: 2024-09-26

## 2024-09-26 RX ORDER — HYDROMORPHONE HCL/PF 1 MG/ML
0.5 SYRINGE (ML) INJECTION ONCE
Status: COMPLETED | OUTPATIENT
Start: 2024-09-26 | End: 2024-09-26

## 2024-09-26 RX ADMIN — SODIUM CHLORIDE 1000 ML: 0.9 INJECTION, SOLUTION INTRAVENOUS at 20:37

## 2024-09-26 RX ADMIN — KETOROLAC TROMETHAMINE 15 MG: 30 INJECTION, SOLUTION INTRAMUSCULAR; INTRAVENOUS at 23:38

## 2024-09-26 RX ADMIN — IOHEXOL 50 ML: 240 INJECTION, SOLUTION INTRATHECAL; INTRAVASCULAR; INTRAVENOUS; ORAL at 21:00

## 2024-09-26 RX ADMIN — IOHEXOL 100 ML: 350 INJECTION, SOLUTION INTRAVENOUS at 22:47

## 2024-09-26 RX ADMIN — KETOROLAC TROMETHAMINE 15 MG: 30 INJECTION, SOLUTION INTRAMUSCULAR; INTRAVENOUS at 20:30

## 2024-09-26 RX ADMIN — HYDROMORPHONE HYDROCHLORIDE 0.5 MG: 1 INJECTION, SOLUTION INTRAMUSCULAR; INTRAVENOUS; SUBCUTANEOUS at 20:33

## 2024-09-26 RX ADMIN — ONDANSETRON 4 MG: 2 INJECTION INTRAMUSCULAR; INTRAVENOUS at 20:43

## 2024-09-27 VITALS
OXYGEN SATURATION: 97 % | DIASTOLIC BLOOD PRESSURE: 89 MMHG | HEART RATE: 58 BPM | RESPIRATION RATE: 20 BRPM | SYSTOLIC BLOOD PRESSURE: 134 MMHG | TEMPERATURE: 98.1 F

## 2024-09-27 LAB
HAV IGM SER QL: NORMAL
HBV CORE IGM SER QL: NORMAL
HBV SURFACE AG SER QL: NORMAL
HCV AB SER QL: NORMAL

## 2024-09-27 RX ORDER — ONDANSETRON 4 MG/1
4 TABLET, FILM COATED ORAL EVERY 8 HOURS PRN
Qty: 6 TABLET | Refills: 0 | Status: SHIPPED | OUTPATIENT
Start: 2024-09-27 | End: 2024-09-29

## 2024-09-27 RX ORDER — TAMSULOSIN HYDROCHLORIDE 0.4 MG/1
0.4 CAPSULE ORAL
Qty: 2 CAPSULE | Refills: 0 | Status: SHIPPED | OUTPATIENT
Start: 2024-09-27 | End: 2024-09-29

## 2024-09-27 RX ORDER — TAMSULOSIN HYDROCHLORIDE 0.4 MG/1
0.4 CAPSULE ORAL ONCE
Status: COMPLETED | OUTPATIENT
Start: 2024-09-27 | End: 2024-09-27

## 2024-09-27 RX ORDER — OXYCODONE HYDROCHLORIDE 5 MG/1
5 TABLET ORAL ONCE
Status: COMPLETED | OUTPATIENT
Start: 2024-09-27 | End: 2024-09-27

## 2024-09-27 RX ORDER — OXYCODONE HYDROCHLORIDE 5 MG/1
5 TABLET ORAL EVERY 6 HOURS PRN
Qty: 8 TABLET | Refills: 0 | Status: SHIPPED | OUTPATIENT
Start: 2024-09-27

## 2024-09-27 RX ADMIN — TAMSULOSIN HYDROCHLORIDE 0.4 MG: 0.4 CAPSULE ORAL at 01:10

## 2024-09-27 RX ADMIN — OXYCODONE HYDROCHLORIDE 5 MG: 5 TABLET ORAL at 01:10

## 2024-09-27 NOTE — ED PROVIDER NOTES
"Final diagnoses:   Calculus of ureterovesical junction (UVJ) - left   Hydroureteronephrosis - Mild, left   Hematuria   Elevated liver enzymes   Ketonuria   Nausea     ED Disposition       ED Disposition   Discharge    Condition   Stable    Date/Time   Fri Sep 27, 2024  1:04 AM    Comment   Carl Herbert discharge to home/self care.                   Assessment & Plan       Medical Decision Making  Patient is a 49-year-old male with a history of herniated lumbar disc, migraines, and no surgical history that presents to the emergency department with aching persistent nonradiating left lower quadrant abdominal pain symptoms for the past week.    Patient hemodynamically stable and afebrile  Transaminases trending upward currently , ; normal alk phos, normal T bilirubin; acetaminophen 5, 2 days of acetaminophen totaling 650 mg daily, discussed patient case with Dr. Wellington, poison control with no recommendation to start Acetadote at this time.  Will however have patient follow-up with PCP for further evaluation of elevated transaminases as current CT abdomen pelvis was negative for acute hepatobiliary pathology.    No leukocytosis, no bandemia  Large occult blood on urinalysis +2 ketones  Negative PT/INR  CT pelvis with [id number] 3 mm calculus in the left ureterovesicular junction causing mild hydroureteronephrosis.  Mild left ureteral wall thickening may be secondary to inflammation or infection.\"  Delivered Multimodal pain control with antiemetics with patient verbalized decrease in flank pain symptoms as well as nausea status post medication delivery  The patient on stopping ciprofloxacin and Flagyl as patient does not have diverticulitis on CAT scan  Prescribed Flomax, oxycodone, and Zofran and counseled patient on medication ministration and side effects  Follow-up with PCP  Follow-up in the emergency department should symptoms persist or exacerbate  Patient demonstrated verbal " understanding of all clinical laboratory imaging findings, discharge instructions follow-up and verbalized agreement with patient current treatment plan.    Amount and/or Complexity of Data Reviewed  Labs: ordered. Decision-making details documented in ED Course.  Radiology: ordered and independent interpretation performed. Decision-making details documented in ED Course.    Risk  Prescription drug management.        ED Course as of 09/27/24 0757   Thu Sep 26, 2024   2204 AST(!): 303   2204 ALT(!): 383   2218 Tylenol- Tuesday and today; 325mg x2; 9/21/24 with acetaminophen PM x 1 dose at HS   2248 Blood, UA(!): Large   2315 ACETAMINOPHEN LEVEL(!): 5   2320 Patient with patient reports [facility] date collected 9/24/2024 with alk phos 150, AST 88, , normal T bilirubin 0.3   2325 Poison control [phone]   Fri Sep 27, 2024   0026 Dr. Wellington, pharmacist Poison control with indication to not start Acetadote at this time with pt provided history.       Medications   sodium chloride 0.9 % bolus 1,000 mL (0 mL Intravenous Stopped 9/26/24 2254)   ketorolac (TORADOL) injection 15 mg (15 mg Intravenous Given 9/26/24 2030)   ondansetron (ZOFRAN) injection 4 mg (4 mg Intravenous Given 9/26/24 2043)   HYDROmorphone (DILAUDID) injection 0.5 mg (0.5 mg Intravenous Given 9/26/24 2033)   iohexol (OMNIPAQUE) 240 MG/ML solution 50 mL (50 mL Oral Given 9/26/24 2100)   iohexol (OMNIPAQUE) 350 MG/ML injection (MULTI-DOSE) 100 mL (100 mL Intravenous Given 9/26/24 2247)   ketorolac (TORADOL) injection 15 mg (15 mg Intravenous Given 9/26/24 2338)   tamsulosin (FLOMAX) capsule 0.4 mg (0.4 mg Oral Given 9/27/24 0110)   oxyCODONE (ROXICODONE) IR tablet 5 mg (5 mg Oral Given 9/27/24 0110)       ED Risk Strat Scores                                               History of Present Illness       Chief Complaint   Patient presents with    Abdominal Pain     Pt states he was diagnosed with diverticulitis 2 days ago at urgent care, prescribed  "antibiotics. Now pt c/o of left sided abdominal pain. Pt denies fevers. +N/V/Constipation.        Patient is a 49-year-old male with a history of herniated lumbar disc, migraines, and no surgical history that presents to the emergency department with aching persistent nonradiating left lower quadrant abdominal pain symptoms for the past week.  Patient has associated symptomatology of nausea vomiting beginning with the current ED presentation of left sided abdominal pain.  Patient reports being diagnosed with diverticulitis 2 days prior to current ED presentation and is currently on day 2 of ciprofloxacin and Flagyl.  Patient also reports that he has recent blood work completed with concern of elevated liver enzymes via results today.  Patient denies questionable dietary or idem intake.  Patient denies recent travel.  Patient with colonoscopy on 6/27/2024; impression-\"... Few diverticula in the sigmoid colon, 2 polyps measuring 5-9 mm in the cecum, 1 polyp measuring 10-19 mm in ascending colon...\" Patient affirms no palliative factors with provocative factors of pressure to left lower quadrant abdomen.  Patient affirms noneffective treatment of ibuprofen and Tylenol.  Patient does report taking regular strength Tylenol, 2 doses for 2 days 2 days prior to current ED presentation.  Patient denies recent fall recent trauma.  Patient denies sick contacts and recent travel.  Patient denies chest pain and shortness of breath.    Past Medical History:   Diagnosis Date    Allergic rhinitis     Herniated lumbar intervertebral disc     Migraines     Sinusitis       Past Surgical History:   Procedure Laterality Date    KNEE ARTHROSCOPY      THROAT SURGERY      WISDOM TOOTH EXTRACTION      X4      Family History   Problem Relation Age of Onset    Diabetes Mother     Diabetes Father     Hypertension Father     No Known Problems Sister     Mental illness Brother     Substance Abuse Brother       Social History     Tobacco Use    " Smoking status: Former     Current packs/day: 0.00     Average packs/day: 0.5 packs/day for 10.0 years (5.0 ttl pk-yrs)     Types: Cigarettes     Start date:      Quit date:      Years since quittin.7    Smokeless tobacco: Never   Substance Use Topics    Alcohol use: Yes     Alcohol/week: 1.0 standard drink of alcohol     Types: 1 Cans of beer per week     Comment: social    Drug use: No      E-Cigarette/Vaping      E-Cigarette/Vaping Substances    Nicotine No     THC No     CBD No     Flavoring No     Other No     Unknown No       I have reviewed and agree with the history as documented.       History provided by:  Patient   used: No    Abdominal Pain  Associated symptoms: nausea and vomiting    Associated symptoms: no chest pain, no chills, no constipation, no cough, no diarrhea, no dysuria, no fever, no hematuria, no shortness of breath and no sore throat        Review of Systems   Constitutional:  Negative for activity change, appetite change, chills and fever.   HENT:  Negative for congestion, ear pain, postnasal drip, rhinorrhea, sinus pressure, sinus pain, sore throat and tinnitus.    Eyes:  Negative for photophobia, pain and visual disturbance.   Respiratory:  Negative for cough, chest tightness and shortness of breath.    Cardiovascular:  Negative for chest pain and palpitations.   Gastrointestinal:  Positive for abdominal pain, nausea and vomiting. Negative for constipation and diarrhea.   Genitourinary:  Negative for difficulty urinating, dysuria, flank pain, frequency, hematuria and urgency.   Musculoskeletal:  Negative for arthralgias, back pain, gait problem, neck pain and neck stiffness.   Skin:  Negative for color change, pallor and rash.   Allergic/Immunologic: Negative for environmental allergies and food allergies.   Neurological:  Negative for dizziness, seizures, syncope, weakness, numbness and headaches.   Psychiatric/Behavioral:  Negative for confusion.    All  other systems reviewed and are negative.          Objective       ED Triage Vitals   Temperature Pulse Blood Pressure Respirations SpO2 Patient Position - Orthostatic VS   09/26/24 1936 09/26/24 1934 09/26/24 1934 09/26/24 1934 09/26/24 1934 09/26/24 1934   98.1 °F (36.7 °C) 81 151/93 20 98 % Sitting      Temp Source Heart Rate Source BP Location FiO2 (%) Pain Score    09/26/24 1936 09/26/24 1934 09/26/24 1934 -- 09/26/24 2030    Oral Monitor Right arm  10 - Worst Possible Pain      Vitals      Date and Time Temp Pulse SpO2 Resp BP Pain Score FACES Pain Rating User   09/27/24 0110 -- -- -- -- -- 2 -- [facility]   09/27/24 0100 -- 58 97 % 20 134/89 -- -- [facility]   09/26/24 2338 -- -- -- -- -- 4 -- [facility]   09/26/24 2200 -- 59 99 % -- 124/82 -- -- KB   09/26/24 2130 -- 59 96 % -- 123/77 -- -- KB   09/26/24 2100 -- 73 95 % -- 123/77 -- -- KD   09/26/24 2033 -- -- -- -- -- 10 - Worst Possible Pain --    09/26/24 2030 -- -- -- -- -- 10 - Worst Possible Pain --    09/26/24 1936 98.1 °F (36.7 °C) -- -- -- -- -- -- SM   09/26/24 1934 -- 81 98 % 20 151/93 -- --             Physical Exam  Vitals and nursing note reviewed.   Constitutional:       General: He is awake.      Appearance: Normal appearance. He is well-developed and normal weight. He is not ill-appearing, toxic-appearing or diaphoretic.      Comments: /93 (BP Location: Right arm)   Pulse 81   Temp 98.1 °F (36.7 °C) (Oral)   Resp 20   SpO2 98%      HENT:      Head: Normocephalic and atraumatic.      Jaw: There is normal jaw occlusion.      Right Ear: Hearing, tympanic membrane and external ear normal. No decreased hearing noted. No drainage, swelling or tenderness. No mastoid tenderness.      Left Ear: Hearing, tympanic membrane and external ear normal. No decreased hearing noted. No drainage, swelling or tenderness. No mastoid tenderness.      Nose: Nose normal.      Mouth/Throat:      Lips: Pink.      Mouth: Mucous membranes are moist.      Pharynx: Oropharynx  is clear. Uvula midline.   Eyes:      General: Lids are normal. Vision grossly intact. Gaze aligned appropriately.         Right eye: No discharge.         Left eye: No discharge.      Extraocular Movements: Extraocular movements intact.      Conjunctiva/sclera: Conjunctivae normal.      Pupils: Pupils are equal, round, and reactive to light.   Neck:      Vascular: No JVD.      Trachea: Trachea and phonation normal. No tracheal tenderness or tracheal deviation.   Cardiovascular:      Rate and Rhythm: Normal rate and regular rhythm.      Pulses: Normal pulses.           Radial pulses are 2+ on the right side and 2+ on the left side.        Posterior tibial pulses are 2+ on the right side and 2+ on the left side.      Heart sounds: Normal heart sounds.   Pulmonary:      Effort: Pulmonary effort is normal.      Breath sounds: Normal breath sounds and air entry. No stridor. No decreased breath sounds, wheezing, rhonchi or rales.   Abdominal:      General: Abdomen is flat. Bowel sounds are normal. There is no distension.      Palpations: Abdomen is soft. Abdomen is not rigid.      Tenderness: There is abdominal tenderness in the left lower quadrant. There is no right CVA tenderness, left CVA tenderness, guarding or rebound.   Musculoskeletal:         General: Normal range of motion.      Cervical back: Full passive range of motion without pain, normal range of motion and neck supple. No rigidity. No spinous process tenderness or muscular tenderness. Normal range of motion.   Feet:      Right foot:      Toenail Condition: Right toenails are normal.      Left foot:      Toenail Condition: Left toenails are normal.   Lymphadenopathy:      Head:      Right side of head: No submental, submandibular, tonsillar, preauricular, posterior auricular or occipital adenopathy.      Left side of head: No submental, submandibular, tonsillar, preauricular, posterior auricular or occipital adenopathy.      Cervical: No cervical  adenopathy.      Right cervical: No superficial, deep or posterior cervical adenopathy.     Left cervical: No superficial, deep or posterior cervical adenopathy.   Skin:     General: Skin is warm.      Capillary Refill: Capillary refill takes less than 2 seconds.      Findings: No rash.   Neurological:      General: No focal deficit present.      Mental Status: He is alert and oriented to person, place, and time. Mental status is at baseline.      GCS: GCS eye subscore is 4. GCS verbal subscore is 5. GCS motor subscore is 6.      Sensory: No sensory deficit.   Psychiatric:         Attention and Perception: Attention normal.         Mood and Affect: Mood normal.         Speech: Speech normal.         Behavior: Behavior normal. Behavior is cooperative.         Thought Content: Thought content normal.         Judgment: Judgment normal.         Results Reviewed       Procedure Component Value Units Date/Time    CK [[id number]]  (Normal) Collected: 09/26/24 2226    Lab Status: Final result Specimen: Blood from Arm, Left Updated: 09/26/24 2323     Total CK 55 U/L     Salicylate level [[id number]]  (Normal) Collected: 09/26/24 2226    Lab Status: Final result Specimen: Blood from Arm, Left Updated: 09/26/24 2312     Salicylate Lvl <5 mg/dL     Acetaminophen level-If concentration is detectable, please discuss with medical  on call. [[id number]]  (Abnormal) Collected: 09/26/24 2226    Lab Status: Final result Specimen: Blood from Arm, Left Updated: 09/26/24 2312     Acetaminophen Level 5 ug/mL     Ethanol [[id number]]  (Normal) Collected: 09/26/24 2226    Lab Status: Final result Specimen: Blood from Arm, Left Updated: 09/26/24 2258     Ethanol Lvl <10 mg/dL     Urine Microscopic [[id number]]  (Abnormal) Collected: 09/26/24 2226    Lab Status: Final result Specimen: Urine, Clean Catch Updated: 09/26/24 2245     RBC, UA Innumerable /hpf      WBC, UA 1-2 /hpf      Epithelial Cells Occasional /hpf      Bacteria, UA None  Seen /hpf      MUCUS THREADS Innumerable    UA w Reflex to Microscopic w Reflex to Culture [[id number]]  (Abnormal) Collected: 09/26/24 2226    Lab Status: Final result Specimen: Urine, Clean Catch Updated: 09/26/24 2245     Color, UA Yellow     Clarity, UA Turbid     Specific Gravity, UA 1.032     pH, UA 6.0     Leukocytes, UA Trace     Nitrite, UA Negative     Protein, UA 50 (1+) mg/dl      Glucose, UA Negative mg/dl      Ketones, UA 40 (2+) mg/dl      Urobilinogen, UA <2.0 mg/dl      Bilirubin, UA Negative     Occult Blood, UA Large    Hepatitis panel, acute [[id number]] Collected: 09/26/24 2226    Lab Status: In process Specimen: Blood from Arm, Left Updated: 09/26/24 2233    RBC Morphology Reflex Test [[id number]] Collected: 09/26/24 2045    Lab Status: Final result Specimen: Blood from Arm, Left Updated: 09/26/24 2201    CBC and differential [[id number]]  (Normal) Collected: 09/26/24 2045    Lab Status: Final result Specimen: Blood from Arm, Left Updated: 09/26/24 2119     WBC 7.63 Thousand/uL      RBC 4.45 Million/uL      Hemoglobin 12.8 g/dL      Hematocrit 38.3 %      MCV 86 fL      MCH 28.8 pg      MCHC 33.4 g/dL      RDW 13.1 %      MPV 9.5 fL      Platelets 180 Thousands/uL     Narrative:      This is an appended report.  These results have been appended to a previously verified report.    Manual Differential(PHLEBS Do Not Order) [[id number]]  (Abnormal) Collected: 09/26/24 2045    Lab Status: Final result Specimen: Blood from Arm, Left Updated: 09/26/24 2119     Segmented % 70 %      Lymphocytes % 21 %      Monocytes % 6 %      Eosinophils % 0 %      Basophils % 1 %      Atypical Lymphocytes % 2 %      Absolute Neutrophils 5.34 Thousand/uL      Absolute Lymphocytes 1.75 Thousand/uL      Absolute Monocytes 0.46 Thousand/uL      Absolute Eosinophils 0.00 Thousand/uL      Absolute Basophils 0.08 Thousand/uL      Total Counted --     RBC Morphology Normal     Platelet Estimate Adequate    Lactic acid, plasma  (w/reflex if result > 2.0) [[id number]]  (Normal) Collected: 09/26/24 2045    Lab Status: Final result Specimen: Blood from Arm, Left Updated: 09/26/24 2115     LACTIC ACID 0.8 mmol/L     Narrative:      Result may be elevated if tourniquet was used during collection.    Comprehensive metabolic panel [[id number]]  (Abnormal) Collected: 09/26/24 2045    Lab Status: Final result Specimen: Blood from Arm, Left Updated: 09/26/24 2115     Sodium 136 mmol/L      Potassium 3.6 mmol/L      Chloride 100 mmol/L      CO2 27 mmol/L      ANION GAP 9 mmol/L      BUN 11 mg/dL      Creatinine 1.19 mg/dL      Glucose 102 mg/dL      Calcium 9.2 mg/dL       U/L       U/L      Alkaline Phosphatase 99 U/L      Total Protein 6.7 g/dL      Albumin 3.8 g/dL      Total Bilirubin 0.64 mg/dL      eGFR --    Narrative:      Notes:     1. eGFR calculation is only valid for adults 18 years and older.  2. EGFR calculation cannot be performed for patients who are transgender, non-binary, or whose legal sex, sex at birth, and gender identity differ.    Lipase [[id number]]  (Normal) Collected: 09/26/24 2045    Lab Status: Final result Specimen: Blood from Arm, Left Updated: 09/26/24 2115     Lipase 57 u/L     Protime-INR [[id number]]  (Normal) Collected: 09/26/24 2045    Lab Status: Final result Specimen: Blood from Arm, Left Updated: 09/26/24 2111     Protime 15.0 seconds      INR 1.11    Narrative:      INR Therapeutic Range    Indication                                             INR Range      Atrial Fibrillation                                               2.0-3.0  Hypercoagulable State                                    2.0.2.3  Left Ventricular Asist Device                            2.0-3.0  Mechanical Heart Valve                                  -    Aortic(with afib, MI, embolism, HF, LA enlargement,    and/or coagulopathy)                                     2.0-3.0 (2.5-3.5)     Mitral                                                              2.5-3.5  Prosthetic/Bioprosthetic Heart Valve               2.0-3.0  Venous thromboembolism (VTE: VT, PE        2.0-3.0    APTT [[id number]]  (Normal) Collected: 09/26/24 2045    Lab Status: Final result Specimen: Blood from Arm, Left Updated: 09/26/24 2111     PTT 27 seconds             CT abdomen pelvis with contrast   ED Interpretation by Jimmie Castillo PA-C (09/27 0022)   Acuity  Immediate    Details      Reading Physician Reading Date Result Priority  Steph Muir MD  [phone] 9/27/2024 STAT    Narrative & Impression  CT ABDOMEN AND PELVIS WITH IV CONTRAST     INDICATION: left lower quadrant abdominal pain; n/v. .     COMPARISON: None.     TECHNIQUE: CT examination of the abdomen and pelvis was performed. Multiplanar 2D reformatted images were created from the source data.     This examination, like all CT scans performed in the [facility], was performed utilizing techniques to minimize radiation dose exposure, including the use of iterative reconstruction and automated exposure control. Radiation dose length   product (DLP) for this visit: 854 mGy-cm     IV Contrast: 100 mL of iohexol (OMNIPAQUE)  Enteric Contrast: Administered.     FINDINGS:     ABDOMEN     LOWER CHEST: Bibasilar atelectasis.     LIVER/BILIARY TREE: Unremarkable.     GALLBLADDER: No calcified gallstones. No pericholecystic inflammatory change.     SPLEEN:    Unremarkable.     PANCREAS: Unremarkable.     ADRENAL GLANDS: Unremarkable.     KIDNEYS/URETERS: Mild left hydroureteronephrosis and delayed nephrogram secondary to 3 mm calculus at the uterovesical junction. Mild left ureteral wall thickening. Mild perinephric and periureteral fat stranding.     STOMACH AND BOWEL: Unremarkable.     APPENDIX: No findings to suggest appendicitis.     ABDOMINOPELVIC CAVITY: No ascites. No pneumoperitoneum. No lymphadenopathy.     VESSELS: Unremarkable for patient's age.     PELVIS     REPRODUCTIVE ORGANS:  Unremarkable for patient's age.     URINARY BLADDER: Unremarkable.     ABDOMINAL WALL/INGUINAL REGIONS: Small fat-containing left inguinal hernia.     BONES: No acute fracture or suspicious osseous lesion. Spinal degenerative changes.     IMPRESSION:     3 mm calculus at the left ureterovesical junction causing mild hydroureteronephrosis.     Mild left ureteral wall thickening may be secondary to inflammation or infection.     The study was marked in E   PIC for immediate notification.        Workstation performed: [id number]           Exam Ended: 24 22:48 Last Resulted: 24 00:09                 Final Interpretation by Steph Muir MD (9)      3 mm calculus at the left ureterovesical junction causing mild hydroureteronephrosis.      Mild left ureteral wall thickening may be secondary to inflammation or infection.      The study was marked in EPIC for immediate notification.         Workstation performed: [id number]             Procedures    ED Medication and Procedure Management   Prior to Admission Medications   Prescriptions Last Dose Informant Patient Reported? Taking?   Acetaminophen 325 MG CAPS  Self Yes No   Sig: Take by mouth   Colchicine (Mitigare) 0.6 MG CAPS  Self Yes No   Patient not taking: Reported on 2024   Ibuprofen 200 MG CAPS  Self Yes No   Sig: Take 600 mg by mouth every 8 (eight) hours as needed   fluticasone (VERAMYST) 27.5 MCG/SPRAY nasal spray  Self Yes No   Si sprays into each nostril daily as needed for rhinitis   guaiFENesin (MUCINEX) 600 mg 12 hr tablet  Self Yes No   Sig: Take 1,200 mg by mouth every 12 (twelve) hours as needed   loratadine-pseudoephedrine (CLARITIN-D 24-HOUR)  mg per 24 hr tablet  Self Yes No   Sig: Take 1 tablet by mouth daily as needed for allergies   meloxicam (Mobic) 15 mg tablet  Self No No   Sig: Take 1 tablet (15 mg total) by mouth daily   multivitamin (THERAGRAN) TABS   Yes No   Sig: Take 1 tablet by mouth daily   omeprazole  (PriLOSEC) 40 MG capsule   No No   Sig: Take 1 capsule (40 mg total) by mouth daily   valACYclovir (VALTREX) 1,000 mg tablet  Self Yes No   Sig: Take by mouth 2 (two) times a day as needed      Facility-Administered Medications: None     Discharge Medication List as of 9/27/2024  1:15 AM        START taking these medications    Details   ondansetron (ZOFRAN) 4 mg tablet Take 1 tablet (4 mg total) by mouth every 8 (eight) hours as needed for nausea or vomiting for up to 2 days, Starting Fri 9/27/2024, Until Sun 9/29/2024 at 2359, Normal      oxyCODONE (ROXICODONE) 5 immediate release tablet Take 1 tablet (5 mg total) by mouth every 6 (six) hours as needed for moderate pain for up to 8 doses Max Daily Amount: 20 mg, Starting Fri 9/27/2024, Normal      tamsulosin (FLOMAX) 0.4 mg Take 1 capsule (0.4 mg total) by mouth daily with dinner for 2 days, Starting Fri 9/27/2024, Until Sun 9/29/2024, Normal           CONTINUE these medications which have NOT CHANGED    Details   Acetaminophen 325 MG CAPS Take by mouth, Historical Med      Colchicine (Mitigare) 0.6 MG CAPS Historical Med      fluticasone (VERAMYST) 27.5 MCG/SPRAY nasal spray 2 sprays into each nostril daily as needed for rhinitis, Historical Med      guaiFENesin (MUCINEX) 600 mg 12 hr tablet Take 1,200 mg by mouth every 12 (twelve) hours as needed, Historical Med      Ibuprofen 200 MG CAPS Take 600 mg by mouth every 8 (eight) hours as needed, Historical Med      loratadine-pseudoephedrine (CLARITIN-D 24-HOUR)  mg per 24 hr tablet Take 1 tablet by mouth daily as needed for allergies, Historical Med      meloxicam (Mobic) 15 mg tablet Take 1 tablet (15 mg total) by mouth daily, Starting Wed 3/27/2024, Normal      multivitamin (THERAGRAN) TABS Take 1 tablet by mouth daily, Historical Med      omeprazole (PriLOSEC) 40 MG capsule Take 1 capsule (40 mg total) by mouth daily, Starting Thu 6/27/2024, Normal      valACYclovir (VALTREX) 1,000 mg tablet Take by mouth  2 (two) times a day as needed, Historical Med           No discharge procedures on file.  ED SEPSIS DOCUMENTATION   Time reflects when diagnosis was documented in both MDM as applicable and the Disposition within this note       Time User Action Codes Description Comment    9/27/2024  1:04 AM Jimmie Castillo [N20.1] Calculus of ureterovesical junction (UVJ)     9/27/2024  1:04 AM Jimmie Castillo [N20.1] Calculus of ureterovesical junction (UVJ) left    9/27/2024  1:05 AM Jimmie Castillo [N13.30] Hydroureteronephrosis     9/27/2024  1:05 AM Jimmie Castillo [N13.30] Hydroureteronephrosis Mild, left    9/27/2024  1:05 AM Jimmie Castillo [R31.9] Hematuria     9/27/2024  1:11 AM Jimmie Castillo [R74.8] Elevated liver enzymes     9/27/2024  1:12 AM Jimmie Castillo [R82.4] Ketonuria     9/27/2024  1:14 AM Jimmie Castillo [R11.0] Nausea                  Jimmie Castillo PA-C  09/27/24 [id number]

## 2024-09-27 NOTE — DISCHARGE INSTRUCTIONS
Acuity   Immediate     Details    Reading Physician Reading Date Result Priority   Steph Muir MD  [phone] 9/27/2024 STAT     Narrative & Impression  CT ABDOMEN AND PELVIS WITH IV CONTRAST     INDICATION: left lower quadrant abdominal pain; n/v. .     COMPARISON: None.     TECHNIQUE: CT examination of the abdomen and pelvis was performed. Multiplanar 2D reformatted images were created from the source data.     This examination, like all CT scans performed in the [facility], was performed utilizing techniques to minimize radiation dose exposure, including the use of iterative reconstruction and automated exposure control. Radiation dose length   product (DLP) for this visit: 854 mGy-cm     IV Contrast: 100 mL of iohexol (OMNIPAQUE)  Enteric Contrast: Administered.     FINDINGS:     ABDOMEN     LOWER CHEST: Bibasilar atelectasis.     LIVER/BILIARY TREE: Unremarkable.     GALLBLADDER: No calcified gallstones. No pericholecystic inflammatory change.     SPLEEN: Unremarkable.     PANCREAS: Unremarkable.     ADRENAL GLANDS: Unremarkable.     KIDNEYS/URETERS: Mild left hydroureteronephrosis and delayed nephrogram secondary to 3 mm calculus at the uterovesical junction. Mild left ureteral wall thickening. Mild perinephric and periureteral fat stranding.     STOMACH AND BOWEL: Unremarkable.     APPENDIX: No findings to suggest appendicitis.     ABDOMINOPELVIC CAVITY: No ascites. No pneumoperitoneum. No lymphadenopathy.     VESSELS: Unremarkable for patient's age.     PELVIS     REPRODUCTIVE ORGANS: Unremarkable for patient's age.     URINARY BLADDER: Unremarkable.     ABDOMINAL WALL/INGUINAL REGIONS: Small fat-containing left inguinal hernia.     BONES: No acute fracture or suspicious osseous lesion. Spinal degenerative changes.     IMPRESSION:     3 mm calculus at the left ureterovesical junction causing mild hydroureteronephrosis.     Mild left ureteral wall thickening may be secondary to  inflammation or infection.     The study was marked in EPIC for immediate notification.        Workstation performed: [id number]              Exam Ended: 09/26/24 22:48 Last Resulted: 09/27/24 00:09                 Stop taking ciprofloxacin and Flagyl at this time.

## 2025-01-15 ENCOUNTER — TELEPHONE (OUTPATIENT)
Dept: GASTROENTEROLOGY | Facility: CLINIC | Age: 50
End: 2025-01-15

## 2025-01-15 NOTE — TELEPHONE ENCOUNTER
Pt has an appointment scheduled on 4/4/25 with Marcos Marquez  that needs to be rescheduled due to a change in provider schedule. Called and left message with pt asking to return call to reschedule. Dr. Cordova had a cancellation tomorrow 1/16/25, let him know if it works for him to call back ASAP to schedule appointment. If that doesn't work, schedule first available.

## 2025-01-16 ENCOUNTER — OFFICE VISIT (OUTPATIENT)
Dept: GASTROENTEROLOGY | Facility: CLINIC | Age: 50
End: 2025-01-16
Payer: COMMERCIAL

## 2025-01-16 VITALS
HEART RATE: 79 BPM | OXYGEN SATURATION: 98 % | SYSTOLIC BLOOD PRESSURE: 153 MMHG | DIASTOLIC BLOOD PRESSURE: 92 MMHG | BODY MASS INDEX: 30.08 KG/M2 | WEIGHT: 247 LBS | HEIGHT: 76 IN

## 2025-01-16 DIAGNOSIS — R74.8 ELEVATED LIVER ENZYMES: ICD-10-CM

## 2025-01-16 DIAGNOSIS — K22.10 EROSIVE ESOPHAGITIS: ICD-10-CM

## 2025-01-16 DIAGNOSIS — K59.1 FUNCTIONAL DIARRHEA: Primary | ICD-10-CM

## 2025-01-16 DIAGNOSIS — R14.0 FLATULENCE/GAS PAIN/BELCHING: ICD-10-CM

## 2025-01-16 PROCEDURE — 99214 OFFICE O/P EST MOD 30 MIN: CPT | Performed by: INTERNAL MEDICINE

## 2025-01-16 NOTE — PROGRESS NOTES
Name: Carl Herbert      : 1975      MRN: [id number]  Encounter Provider: Nisreen Cordova MD  Encounter Date: 2025   Encounter department: [facility]  :  Assessment & Plan  Functional diarrhea  Chronic symptoms > 12 months, clinically stable with management  Incidentally noted to have C. difficile toxin, E. coli on bacterial panel,  His symptoms have improved with conservative management, and introduction of psyllium, as well as dietary and lifestyle management  Continue psyllium- can adjust timing per symptom response  Continue avoidance of processed foods         Flatulence/gas pain/belching  See above-       Erosive esophagitis  Inflammatory changes at the GE junction, consistent with GERD versus NSAID esophagitis  Patient has minimize/discontinued NSAID use  He has completed 6-month treatment of PPI therapy  Discussed weaning of PPI  Discuss PPI use to overlap NSAID use if needed       Elevated liver enzymes  Acute hepatocellular elevation during ER evaluation for acute ureterovesicular junction calculus/nephrolithiasis  Previous liver enzymes normal on labs 1 month prior  Acute hepatitis panel is negative   Suspect acute injury  Counseled on acetaminophen use, goal < 4 g/day  Orders:    Comprehensive metabolic panel; Future        History of Present Illness   HPI  Carl Herbert is a 49 y.o. male who presents in office visit follow-up for irregular bowel habits with loose stools, fecal urgency and gas.    Colonoscopy 2024-3 tubular adenomas, recall 3 years  EGD 2024-GE junction inflammation- started on omeprazole daily    Testing including pancreatic elastase, Giardia, celiac testing was within normal limits  He was noted to have a positive C. difficile toxin, and E. coli on bacterial panel-his symptoms had improved-he is having 1-2 bowel movements per day.  He is using 2 pills of natures Sunshine psyllium before lunch.  He notes his frequency and  "urgency and stools has improved.  He does note that his nerves or a \"hectic day at work) will proceed symptoms.  Denies any rectal bleeding.    He denies any upper GI symptoms.  He is taking omeprazole daily.            Review of Systems       Objective   /92 (BP Location: Left arm, Patient Position: Sitting, Cuff Size: Standard)   Pulse 79   Ht 6' 4\" (1.93 m)   Wt 112 kg (247 lb)   SpO2 98%   BMI 30.07 kg/m²      Physical Exam  Vitals and nursing note reviewed.   Constitutional:       General: He is not in acute distress.  HENT:      Head: Normocephalic and atraumatic.   Eyes:      Conjunctiva/sclera: Conjunctivae normal.   Cardiovascular:      Rate and Rhythm: Normal rate and regular rhythm.      Heart sounds: No murmur heard.  Pulmonary:      Effort: Pulmonary effort is normal. No respiratory distress.      Breath sounds: Normal breath sounds.   Abdominal:      Palpations: Abdomen is soft.      Tenderness: There is no abdominal tenderness.   Musculoskeletal:         General: No swelling.      Cervical back: Neck supple.   Skin:     General: Skin is warm and dry.   Neurological:      Mental Status: He is alert.   Psychiatric:         Mood and Affect: Mood normal.       Lab Results   Component Value Date    WBC 7.63 09/26/2024    HGB 12.8 09/26/2024    HCT 38.3 09/26/2024    MCV 86 09/26/2024     09/26/2024       Lab Results   Component Value Date    SODIUM 136 09/26/2024    K 3.6 09/26/2024     09/26/2024    CO2 27 09/26/2024    AGAP 9 09/26/2024    BUN 11 09/26/2024    CREATININE 1.19 09/26/2024    GLUC 102 09/26/2024    CALCIUM 9.2 09/26/2024     (H) 09/26/2024     (H) 09/26/2024    ALKPHOS 99 09/26/2024    TP 6.7 09/26/2024    TBILI 0.64 09/26/2024    EGFR 73 08/19/2024     Reviewed recent lab results.      "

## 2025-01-16 NOTE — ASSESSMENT & PLAN NOTE
Inflammatory changes at the GE junction, consistent with GERD versus NSAID esophagitis  Patient has minimize/discontinued NSAID use  He has completed 6-month treatment of PPI therapy  Discussed weaning of PPI  Discuss PPI use to overlap NSAID use if needed

## 2025-01-16 NOTE — PATIENT INSTRUCTIONS
Stop prilosec/omeprazole  - take 1 pill every other day x 1 week  - take 1 pill every 3 days x 1 week  - then stop

## 2025-01-16 NOTE — ASSESSMENT & PLAN NOTE
Chronic symptoms > 12 months, clinically stable with management  Incidentally noted to have C. difficile toxin, E. coli on bacterial panel,  His symptoms have improved with conservative management, and introduction of psyllium, as well as dietary and lifestyle management  Continue psyllium- can adjust timing per symptom response  Continue avoidance of processed foods